# Patient Record
Sex: FEMALE | Race: WHITE | Employment: FULL TIME | URBAN - METROPOLITAN AREA
[De-identification: names, ages, dates, MRNs, and addresses within clinical notes are randomized per-mention and may not be internally consistent; named-entity substitution may affect disease eponyms.]

---

## 2017-10-25 PROBLEM — M81.8 OTHER OSTEOPOROSIS WITHOUT CURRENT PATHOLOGICAL FRACTURE: Status: ACTIVE | Noted: 2017-10-25

## 2020-03-01 ENCOUNTER — HOSPITAL ENCOUNTER (EMERGENCY)
Age: 65
Discharge: HOME | End: 2020-03-01
Payer: COMMERCIAL

## 2020-03-01 VITALS
BODY MASS INDEX: 28.63 KG/M2 | HEART RATE: 64 BPM | WEIGHT: 200 LBS | SYSTOLIC BLOOD PRESSURE: 134 MMHG | HEIGHT: 70 IN | OXYGEN SATURATION: 99 % | DIASTOLIC BLOOD PRESSURE: 74 MMHG | RESPIRATION RATE: 18 BRPM | TEMPERATURE: 98.2 F

## 2020-03-01 PROCEDURE — 99212 OFFICE O/P EST SF 10 MIN: CPT

## 2020-03-01 PROCEDURE — 96372 THER/PROPH/DIAG INJ SC/IM: CPT

## 2020-03-01 PROCEDURE — 6360000002 HC RX W HCPCS: Performed by: PHYSICIAN ASSISTANT

## 2020-03-01 RX ORDER — VIT C/B6/B5/MAGNESIUM/HERB 173 50-5-6-5MG
CAPSULE ORAL
COMMUNITY

## 2020-03-01 RX ORDER — PHENOL 1.4 %
1 AEROSOL, SPRAY (ML) MUCOUS MEMBRANE DAILY
COMMUNITY

## 2020-03-01 RX ORDER — FUROSEMIDE 20 MG/1
20 TABLET ORAL 2 TIMES DAILY
COMMUNITY

## 2020-03-01 RX ORDER — DICLOFENAC SODIUM 75 MG/1
75 TABLET, DELAYED RELEASE ORAL 2 TIMES DAILY
COMMUNITY

## 2020-03-01 RX ORDER — DEXAMETHASONE SODIUM PHOSPHATE 10 MG/ML
10 INJECTION, SOLUTION INTRAMUSCULAR; INTRAVENOUS ONCE
Status: COMPLETED | OUTPATIENT
Start: 2020-03-01 | End: 2020-03-01

## 2020-03-01 RX ORDER — ALENDRONATE SODIUM 70 MG/1
70 TABLET ORAL
COMMUNITY

## 2020-03-01 RX ORDER — KETOROLAC TROMETHAMINE 30 MG/ML
30 INJECTION, SOLUTION INTRAMUSCULAR; INTRAVENOUS ONCE
Status: COMPLETED | OUTPATIENT
Start: 2020-03-01 | End: 2020-03-01

## 2020-03-01 RX ORDER — KETOROLAC TROMETHAMINE 10 MG/1
10 TABLET, FILM COATED ORAL EVERY 6 HOURS PRN
Qty: 12 TABLET | Refills: 0 | Status: SHIPPED | OUTPATIENT
Start: 2020-03-01 | End: 2020-03-04

## 2020-03-01 RX ADMIN — DEXAMETHASONE SODIUM PHOSPHATE 10 MG: 10 INJECTION, SOLUTION INTRAMUSCULAR; INTRAVENOUS at 19:40

## 2020-03-01 RX ADMIN — KETOROLAC TROMETHAMINE 30 MG: 30 INJECTION, SOLUTION INTRAMUSCULAR; INTRAVENOUS at 19:40

## 2020-03-01 ASSESSMENT — PAIN DESCRIPTION - ONSET
ONSET: GRADUAL
ONSET: GRADUAL

## 2020-03-01 ASSESSMENT — PAIN DESCRIPTION - LOCATION
LOCATION: BACK;BUTTOCKS;LEG
LOCATION: BACK;BUTTOCKS;LEG

## 2020-03-01 ASSESSMENT — PAIN DESCRIPTION - DESCRIPTORS
DESCRIPTORS: STABBING
DESCRIPTORS: STABBING

## 2020-03-01 ASSESSMENT — PAIN DESCRIPTION - FREQUENCY
FREQUENCY: CONTINUOUS
FREQUENCY: CONTINUOUS

## 2020-03-01 ASSESSMENT — PAIN SCALES - GENERAL
PAINLEVEL_OUTOF10: 6
PAINLEVEL_OUTOF10: 8
PAINLEVEL_OUTOF10: 8

## 2020-03-01 ASSESSMENT — PAIN DESCRIPTION - ORIENTATION
ORIENTATION: LEFT;LOWER
ORIENTATION: LEFT;LOWER

## 2020-03-01 ASSESSMENT — PAIN DESCRIPTION - PAIN TYPE
TYPE: ACUTE PAIN
TYPE: ACUTE PAIN

## 2020-03-01 ASSESSMENT — PAIN DESCRIPTION - PROGRESSION
CLINICAL_PROGRESSION: GRADUALLY WORSENING
CLINICAL_PROGRESSION: GRADUALLY IMPROVING

## 2021-07-12 ENCOUNTER — OFFICE VISIT (OUTPATIENT)
Dept: ORTHOPEDIC SURGERY | Age: 66
End: 2021-07-12
Payer: COMMERCIAL

## 2021-07-12 VITALS — BODY MASS INDEX: 28.63 KG/M2 | WEIGHT: 200 LBS | HEIGHT: 70 IN | TEMPERATURE: 98 F

## 2021-07-12 DIAGNOSIS — S72.142G CLOSED DISPLACED INTERTROCHANTERIC FRACTURE OF LEFT FEMUR WITH DELAYED HEALING, SUBSEQUENT ENCOUNTER: Primary | ICD-10-CM

## 2021-07-12 DIAGNOSIS — S82.831A TRAUMATIC CLOSED NONDISPLACED FRACTURE OF DISTAL FIBULA, RIGHT, INITIAL ENCOUNTER: ICD-10-CM

## 2021-07-12 PROCEDURE — 99203 OFFICE O/P NEW LOW 30 MIN: CPT | Performed by: ORTHOPAEDIC SURGERY

## 2021-07-12 RX ORDER — HYDROCODONE BITARTRATE AND ACETAMINOPHEN 5; 325 MG/1; MG/1
1 TABLET ORAL EVERY 6 HOURS PRN
Qty: 28 TABLET | Refills: 0 | Status: SHIPPED | OUTPATIENT
Start: 2021-07-12 | End: 2021-07-19

## 2021-07-12 RX ORDER — ENOXAPARIN SODIUM 300 MG/3ML
INJECTION INTRAVENOUS; SUBCUTANEOUS DAILY
COMMUNITY

## 2021-07-12 RX ORDER — OXYCODONE HYDROCHLORIDE AND ACETAMINOPHEN 5; 325 MG/1; MG/1
1 TABLET ORAL EVERY 4 HOURS PRN
COMMUNITY
End: 2021-11-19

## 2021-07-12 NOTE — PROGRESS NOTES
Nicole Booker is a 60 y.o. female, who presents   Chief Complaint   Patient presents with    Hip Injury     Left hip fracture that required zoya and screw while on vaction in [place]. DOS 6/27/21. Patient fell and landed on hip.  Ankle Injury     Right ankle fracture. Hurt ankle in same fall as broken hip. Was placed in splint after injury and replaced in boot before returning home. HPI[de-identified] Mrs. Danika Hillman suffered an injury in a fall in [place] couple weeks ago. This was actually 6/27/2021. She ended up having surgery for for internal fixation of what is felt to be an intertrochanteric fracture. She also had a nondisplaced right distal fibula fracture which has been treated in the boot brace. She is doing fairly well though she is sore and stiff. He is using a walker and weightbearing on the right lower extremity with the boot brace on. Imaging was not sent with her. Allergies; medications; past medical, surgical, family, and social history; and problem list have been reviewed today and updated as indicated in this encounter - see below following Ortho specifics. Musculoskeletal: Gait with the walker is fairly good. There is some tenderness in the lateral right ankle as expected. There is mild edema. Ankle was not stressed because of the known injury. Left hip range of motion is fairly good with some guarding. She is sore as expected. Her alignment and length are good. Knee range of motion is good. She does have bilateral total knee replacements done by other surgeons in the past.    The thigh incisions are typical of those used for cephalomedullary lock nail fixation. The wounds look fine    Radiologic Studies: None    ASSESSMENT:  Kourtney Ceron was seen today for hip injury and ankle injury.     Diagnoses and all orders for this visit:    Closed displaced intertrochanteric fracture of left femur with delayed healing, subsequent encounter  -     HYDROcodone-acetaminophen (NORCO) 5-325 MG per tablet; Take 1 tablet by mouth every 6 hours as needed for Pain (pain) for up to 7 days. Traumatic closed nondisplaced fracture of distal fibula, right, initial encounter     Treatment alternatives were reviewed including medical and physical therapies, injections, and surgical options, expected risks benefits and likely outcome of each were discussed in detail, questions asked and answered and understood. We discussed the history as well as exam and expected issues she will experience postop which are not to worry her. PLAN: Continue with weightbearing right lower extremity with the brace. She can be partial weightbearing left lower extremity. She should continue to use her walker. She requested analgesia and was written prescription for short period of time. We will refer her for outpatient physical therapy. We will also schedule her for a return visit in a week and x-ray the right ankle and the left hip. Patient Active Problem List   Diagnosis    Other osteoporosis without current pathological fracture       Past Medical History:   Diagnosis Date    Diabetes mellitus ([facility] [address]     IDDM    Osteoporosis     fosamax    Restless leg syndrome     S/P total knee replacement     left knee       Past Surgical History:   Procedure Laterality Date     SECTION      CHOLECYSTECTOMY      JOINT REPLACEMENT      bilaterally    KNEE SURGERY  2011    torn meniscus       Current Outpatient Medications   Medication Sig Dispense Refill    enoxaparin (LOVENOX) 300 MG/3ML injection Inject into the skin daily      oxyCODONE-acetaminophen (PERCOCET) 5-325 MG per tablet Take 1 tablet by mouth every 4 hours as needed for Pain.  HYDROcodone-acetaminophen (NORCO) 5-325 MG per tablet Take 1 tablet by mouth every 6 hours as needed for Pain (pain) for up to 7 days.  28 tablet 0    Dulaglutide (TRULICITY SC) Inject into the skin      METFORMIN HCL PO Take by mouth      furosemide (LASIX) 20 MG tablet Take 20 mg by mouth 2 times daily      diclofenac (VOLTAREN) 75 MG EC tablet Take 75 mg by mouth 2 times daily      alendronate (FOSAMAX) 70 MG tablet Take 70 mg by mouth every 7 days      Turmeric 500 MG CAPS Take by mouth      calcium carbonate 600 MG TABS tablet Take 1 tablet by mouth daily      BIOTIN PO Take by mouth      ketorolac (TORADOL) 10 MG tablet Take 1 tablet by mouth every 6 hours as needed for Pain Patient had IM Toradol here in the ER. 12 tablet 0    empagliflozin (JARDIANCE) 10 MG tablet Take 10 mg by mouth daily      pregabalin (LYRICA) 75 MG capsule Take 75 mg by mouth 2 times daily      Multiple Vitamins-Minerals (THERAPEUTIC MULTIVITAMIN-MINERALS) tablet Take 1 tablet by mouth daily Indications: with vision      insulin glargine (LANTUS) 100 UNIT/ML injection Inject 20 Units into the skin nightly. No current facility-administered medications for this visit. No Known Allergies    Social History     Socioeconomic History    Marital status:      Spouse name: None    Number of children: None    Years of education: None    Highest education level: None   Occupational History    None   Tobacco Use    Smoking status: Never Smoker    Smokeless tobacco: Never Used   Substance and Sexual Activity    Alcohol use: No    Drug use: No    Sexual activity: Yes   Other Topics Concern    None   Social History Narrative    None     Social Determinants of Health     Financial Resource Strain:     Difficulty of Paying Living Expenses:    Food Insecurity:     Worried About Running Out of Food in the Last Year:     Ran Out of Food in the Last Year:    Transportation Needs:     Lack of Transportation (Medical):      Lack of Transportation (Non-Medical):    Physical Activity:     Days of Exercise per Week:     Minutes of Exercise per Session:    Stress:     Feeling of Stress :    Social Connections:     Frequency of Communication with Friends and Family:     Frequency of Social Gatherings with Friends and Family:     Attends Protestant Services:     Active Member of Clubs or Organizations:     Attends Club or Organization Meetings:     Marital Status:    Intimate Partner Violence:     Fear of Current or Ex-Partner:     Emotionally Abused:     Physically Abused:     Sexually Abused:        History reviewed. No pertinent family history. Review of Systems:   As follows except as previously noted in HPI:  Constitutional: Negative for chills, diaphoresis,  fever   Respiratory: Negative for cough, shortness of breath and wheezing. Cardiovascular: Negative for chest pain and palpitations. Neurological: Negative for dizziness, syncope,   GI / : abdominal pain or cramping  Musculoskeletal: see HPI       Objective:   Physical Exam   Constitutional: Oriented to person, place, and time. and appears well-developed and well-nourished. :   Head: Normocephalic and atraumatic. Neck: Neck supple. Eyes: EOM are normal.   Pulmonary/Chest: Effort normal.  No respiratory distress, no wheezes. Neurological: Alert and oriented to person  Skin: Skin is warm and dry. Vivi Collins DO    7/12/21  12:18 PM    All reasonable efforts have been made to minimize the risk of errors that may occur in the use of voice recognition and other electronic means of charting.

## 2021-07-16 DIAGNOSIS — S72.142G CLOSED DISPLACED INTERTROCHANTERIC FRACTURE OF LEFT FEMUR WITH DELAYED HEALING, SUBSEQUENT ENCOUNTER: Primary | ICD-10-CM

## 2021-07-16 DIAGNOSIS — S82.831A TRAUMATIC CLOSED NONDISPLACED FRACTURE OF DISTAL FIBULA, RIGHT, INITIAL ENCOUNTER: ICD-10-CM

## 2021-07-19 ENCOUNTER — OFFICE VISIT (OUTPATIENT)
Dept: ORTHOPEDIC SURGERY | Age: 66
End: 2021-07-19
Payer: COMMERCIAL

## 2021-07-19 VITALS — BODY MASS INDEX: 28.63 KG/M2 | WEIGHT: 200 LBS | HEIGHT: 70 IN

## 2021-07-19 DIAGNOSIS — S72.142G CLOSED DISPLACED INTERTROCHANTERIC FRACTURE OF LEFT FEMUR WITH DELAYED HEALING, SUBSEQUENT ENCOUNTER: Primary | ICD-10-CM

## 2021-07-19 DIAGNOSIS — S82.831A TRAUMATIC CLOSED NONDISPLACED FRACTURE OF DISTAL FIBULA, RIGHT, INITIAL ENCOUNTER: ICD-10-CM

## 2021-07-19 PROCEDURE — 99213 OFFICE O/P EST LOW 20 MIN: CPT | Performed by: ORTHOPAEDIC SURGERY

## 2021-07-19 NOTE — PROGRESS NOTES
Chief Complaint:   Chief Complaint   Patient presents with    Hip Pain     Left hip fracture follow up. DOS 6/27/2021    Ankle Pain     Right ankle fracture follow up. Renata Lewis's 3 weeks post injury to her right distal fibula and her left intertrochanteric femur. She is essentially 3 weeks postop fixation of the intertrochanteric fracture. She is doing fairly well. She is not having a lot of discomfort. Allergies; medications; past medical, surgical, family, and social history; and problem list have been reviewed today and updated as indicated in this encounter seen below. Exam: The ankles in the boot. There is minimal tenderness. Knee range of motion is good without pain. Left hip range of motion is limited with some tenderness as expected. No complications of surgery noted. Radiographs: Imaging of the right ankle and 3 views shows good alignment of the Steele B distal fibula fracture. The ankle mortise and syndesmosis are well-maintained. The left hip in 2 views shows a short locked cephalomedullary nail. This is in good overall alignment. The lag screw seems to be well located in the femoral head near the center center area. There is noticeable shortening along the line of the lag screw and comminution in the areas noted with 3 fragments of the lesser trochanter prominent. ASSESSMENT:    Ines Quevedo was seen today for hip pain and ankle pain. Diagnoses and all orders for this visit:    Closed displaced intertrochanteric fracture of left femur with delayed healing, subsequent encounter    Traumatic closed nondisplaced fracture of distal fibula, right, initial encounter        PLAN: Imaging results were discussed with the patient and her . She should continue with partial weightbearing on the left lower extremity. She may continue weightbearing on the right with the boot brace in place. We will reji-ray in 4 weeks equal and hip to assess healing progress.     Return in about 4 weeks (around 2021) for X-ray left hip and right ankle. Current Outpatient Medications   Medication Sig Dispense Refill    enoxaparin (LOVENOX) 300 MG/3ML injection Inject into the skin daily      oxyCODONE-acetaminophen (PERCOCET) 5-325 MG per tablet Take 1 tablet by mouth every 4 hours as needed for Pain.  HYDROcodone-acetaminophen (NORCO) 5-325 MG per tablet Take 1 tablet by mouth every 6 hours as needed for Pain (pain) for up to 7 days. 28 tablet 0    Dulaglutide (TRULICITY SC) Inject into the skin      METFORMIN HCL PO Take by mouth      furosemide (LASIX) 20 MG tablet Take 20 mg by mouth 2 times daily      diclofenac (VOLTAREN) 75 MG EC tablet Take 75 mg by mouth 2 times daily      alendronate (FOSAMAX) 70 MG tablet Take 70 mg by mouth every 7 days      Turmeric 500 MG CAPS Take by mouth      calcium carbonate 600 MG TABS tablet Take 1 tablet by mouth daily      BIOTIN PO Take by mouth      ketorolac (TORADOL) 10 MG tablet Take 1 tablet by mouth every 6 hours as needed for Pain Patient had IM Toradol here in the ER. 12 tablet 0    empagliflozin (JARDIANCE) 10 MG tablet Take 10 mg by mouth daily      pregabalin (LYRICA) 75 MG capsule Take 75 mg by mouth 2 times daily      Multiple Vitamins-Minerals (THERAPEUTIC MULTIVITAMIN-MINERALS) tablet Take 1 tablet by mouth daily Indications: with vision      insulin glargine (LANTUS) 100 UNIT/ML injection Inject 20 Units into the skin nightly. No current facility-administered medications for this visit.        Patient Active Problem List   Diagnosis    Other osteoporosis without current pathological fracture       Past Medical History:   Diagnosis Date    Diabetes mellitus ([facility] [address]     IDDM    Osteoporosis     fosamax    Restless leg syndrome     S/P total knee replacement     left knee       Past Surgical History:   Procedure Laterality Date     SECTION      CHOLECYSTECTOMY      JOINT REPLACEMENT bilaterally    KNEE SURGERY  1/2011    torn meniscus       No Known Allergies    Social History     Socioeconomic History    Marital status:      Spouse name: None    Number of children: None    Years of education: None    Highest education level: None   Occupational History    None   Tobacco Use    Smoking status: Never Smoker    Smokeless tobacco: Never Used   Substance and Sexual Activity    Alcohol use: No    Drug use: No    Sexual activity: Yes   Other Topics Concern    None   Social History Narrative    None     Social Determinants of Health     Financial Resource Strain:     Difficulty of Paying Living Expenses:    Food Insecurity:     Worried About Running Out of Food in the Last Year:     Ran Out of Food in the Last Year:    Transportation Needs:     Lack of Transportation (Medical):  Lack of Transportation (Non-Medical):    Physical Activity:     Days of Exercise per Week:     Minutes of Exercise per Session:    Stress:     Feeling of Stress :    Social Connections:     Frequency of Communication with Friends and Family:     Frequency of Social Gatherings with Friends and Family:     Attends Synagogue Services:     Active Member of Clubs or Organizations:     Attends Club or Organization Meetings:     Marital Status:    Intimate Partner Violence:     Fear of Current or Ex-Partner:     Emotionally Abused:     Physically Abused:     Sexually Abused:        Review of Systems  As follows except as previously noted in HPI:  Constitutional: Negative for chills, diaphoresis, fatigue, fever and unexpected weight change. Respiratory: Negative for cough, shortness of breath and wheezing. Cardiovascular: Negative for chest pain and palpitations. Neurological: Negative for dizziness, syncope, cephalgia. GI / : negative  Musculoskeletal: see HPI       Objective:   Physical Exam   Constitutional: Oriented to person, place, and time.  and appears well-developed and well-nourished. :   Head: Normocephalic and atraumatic. Eyes: EOM are normal.   Neck: Neck supple. Cardiovascular: Normal rate and regular rhythm. Pulmonary/Chest: Effort normal. No stridor. No respiratory distress, no wheezes. Abdominal:  No abnormal distension. Neurological: Alert and oriented to person, place, and time. Skin: Skin is warm and dry. Psychiatric: Normal mood and affect.  Behavior is normal. Thought content normal.    GORDON Narayan DO    7/19/21  10:42 AM

## 2021-08-16 ENCOUNTER — OFFICE VISIT (OUTPATIENT)
Dept: ORTHOPEDIC SURGERY | Age: 66
End: 2021-08-16

## 2021-08-16 VITALS — BODY MASS INDEX: 28.63 KG/M2 | HEIGHT: 70 IN | WEIGHT: 200 LBS | TEMPERATURE: 98 F

## 2021-08-16 DIAGNOSIS — S72.142G CLOSED DISPLACED INTERTROCHANTERIC FRACTURE OF LEFT FEMUR WITH DELAYED HEALING, SUBSEQUENT ENCOUNTER: ICD-10-CM

## 2021-08-16 DIAGNOSIS — S82.831A TRAUMATIC CLOSED NONDISPLACED FRACTURE OF DISTAL FIBULA, RIGHT, INITIAL ENCOUNTER: ICD-10-CM

## 2021-08-16 DIAGNOSIS — M25.552 LEFT HIP PAIN: ICD-10-CM

## 2021-08-16 DIAGNOSIS — M79.671 RIGHT FOOT PAIN: Primary | ICD-10-CM

## 2021-08-16 DIAGNOSIS — M25.562 ACUTE PAIN OF LEFT KNEE: Primary | ICD-10-CM

## 2021-08-16 PROCEDURE — 99024 POSTOP FOLLOW-UP VISIT: CPT | Performed by: ORTHOPAEDIC SURGERY

## 2021-08-16 RX ORDER — CHOLECALCIFEROL (VITAMIN D3) 1250 MCG
CAPSULE ORAL
COMMUNITY
Start: 2021-08-09

## 2021-08-16 RX ORDER — PANTOPRAZOLE SODIUM 40 MG/1
TABLET, DELAYED RELEASE ORAL
COMMUNITY
Start: 2021-07-18

## 2021-08-16 RX ORDER — VALACYCLOVIR HYDROCHLORIDE 1 G/1
TABLET, FILM COATED ORAL
COMMUNITY

## 2021-08-16 RX ORDER — GLIMEPIRIDE 4 MG/1
4 TABLET ORAL
COMMUNITY

## 2021-08-16 RX ORDER — ACETAMINOPHEN AND CODEINE PHOSPHATE 300; 30 MG/1; MG/1
TABLET ORAL
COMMUNITY
End: 2021-11-19

## 2021-09-13 ENCOUNTER — OFFICE VISIT (OUTPATIENT)
Dept: ORTHOPEDIC SURGERY | Age: 66
End: 2021-09-13
Payer: COMMERCIAL

## 2021-09-13 VITALS — WEIGHT: 200 LBS | BODY MASS INDEX: 28.63 KG/M2 | TEMPERATURE: 98 F | HEIGHT: 70 IN

## 2021-09-13 DIAGNOSIS — S82.831A TRAUMATIC CLOSED NONDISPLACED FRACTURE OF DISTAL FIBULA, RIGHT, INITIAL ENCOUNTER: ICD-10-CM

## 2021-09-13 DIAGNOSIS — S72.142G CLOSED DISPLACED INTERTROCHANTERIC FRACTURE OF LEFT FEMUR WITH DELAYED HEALING, SUBSEQUENT ENCOUNTER: Primary | ICD-10-CM

## 2021-09-13 PROCEDURE — 99213 OFFICE O/P EST LOW 20 MIN: CPT | Performed by: ORTHOPAEDIC SURGERY

## 2021-09-13 RX ORDER — EMPAGLIFLOZIN 25 MG/1
TABLET, FILM COATED ORAL
COMMUNITY
Start: 2021-09-11

## 2021-09-13 RX ORDER — TRAMADOL HYDROCHLORIDE 50 MG/1
50 TABLET ORAL EVERY 6 HOURS PRN
Qty: 28 TABLET | Refills: 0 | Status: SHIPPED | OUTPATIENT
Start: 2021-09-13 | End: 2021-09-16

## 2021-09-13 NOTE — LETTER
[address] [place] [address]  Phone: [phone]  Fax: [phone]    Jhoana Quintanilla DO         September 13, 2021     Patient: Lynn Tirado   YOB: 1955   Date of Visit: 9/13/2021       To Whom It May Concern: It is my medical opinion that Ashlie Bills requires a disability parking placard for the following reasons:  She has limited walking ability due to an orthopedic condition. Duration of need: 1 year    If you have any questions or concerns, please don't hesitate to call.     Sincerely,        Jhoana Quintanilla DO

## 2021-09-13 NOTE — PROGRESS NOTES
Chief Complaint:   Chief Complaint   Patient presents with    Ankle Pain     right ankle f/u doing great    Hip Pain     left hip f/u  having some pain, the left knee is improving       Justyn Hernandez has 16 weeks post op intertrochanteric enteric left hip fracture fixation. He is doing pretty well. Her range of motion is fairly good but she still has a limp and uses a trekking type cane. Her right ankle is doing very well. She has plans to return to work very soon. Her issues are the long hours and also the mix of activities. She is a manager at [employer]. She is requesting a final prescription for analgesics. She is also asking for handicap parking extension considering her workplace and parking issues. Allergies; medications; past medical, surgical, family, and social history; and problem list have been reviewed today and updated as indicated in this encounter seen below. Exam: The left hip moves fairly well. There is little discomfort. The left knee which has been replaced is stable and moves well. The right knee which has been replaced moves well and is stable. Right ankle motion is fairly good with some residual edema but little discomfort. Radiographs: Previous imaging of the hip shows progressive healing with some shortening along the axis of the femoral neck. Fixation is stable. ASSESSMENT:    Cyndy Dyer was seen today for ankle pain and hip pain. Diagnoses and all orders for this visit:    Closed displaced intertrochanteric fracture of left femur with delayed healing, subsequent encounter  -     traMADol (ULTRAM) 50 MG tablet; Take 1 tablet by mouth every 6 hours as needed for Pain (pain) for up to 3 days. Traumatic closed nondisplaced fracture of distal fibula, right, initial encounter  -     traMADol (ULTRAM) 50 MG tablet; Take 1 tablet by mouth every 6 hours as needed for Pain (pain) for up to 3 days.     We discussed her limp which should gradually improve but in part is related to her loss of length from the fracture collapsing along the designed axis of the lag screw. She could regain some length by no more than quarter inch shoe insert and beyond that would require a built-up sole in her shoe. This is something for her to consider over time. PLAN: We will release her back to work with restrictions including 70% sedentary activity, 30% walking. Will extend her handicap parking privileges for about 6 months. Her workday should be limited to 8 hours. We will follow-up in 4 weeks. Return in about 4 weeks (around 10/11/2021). Current Outpatient Medications   Medication Sig Dispense Refill    JARDIANCE 25 MG tablet       traMADol (ULTRAM) 50 MG tablet Take 1 tablet by mouth every 6 hours as needed for Pain (pain) for up to 3 days. 28 tablet 0    acetaminophen-codeine (TYLENOL #3) 300-30 MG per tablet acetaminophen 300 mg-codeine 30 mg tablet      calcium carbonate-vitamin D (CALTRATE) 600-400 MG-UNIT TABS per tab Take 1 tablet by mouth daily      Cholecalciferol (VITAMIN D3) 1.25 MG (72424 UT) CAPS TAKE 1 CAPSULE BY MOUTH EVERY WEEK      pantoprazole (PROTONIX) 40 MG tablet TAKE ONE TABLET BY MOUTH EVERY DAY      valACYclovir (VALTREX) 1 g tablet valacyclovir 1 gram tablet   TAKE ONE TABLET BY MOUTH THREE TIMES A DAY AS DIRECTED      glimepiride (AMARYL) 4 MG tablet Take 4 mg by mouth every morning (before breakfast) bid      enoxaparin (LOVENOX) 300 MG/3ML injection Inject into the skin daily      oxyCODONE-acetaminophen (PERCOCET) 5-325 MG per tablet Take 1 tablet by mouth every 4 hours as needed for Pain.       Dulaglutide (TRULICITY SC) Inject into the skin      furosemide (LASIX) 20 MG tablet Take 20 mg by mouth 2 times daily      diclofenac (VOLTAREN) 75 MG EC tablet Take 75 mg by mouth 2 times daily      alendronate (FOSAMAX) 70 MG tablet Take 70 mg by mouth every 7 days      Turmeric 500 MG CAPS Take by mouth      calcium carbonate 600 MG TABS tablet Take 1 tablet by mouth daily      BIOTIN PO Take by mouth      pregabalin (LYRICA) 75 MG capsule Take 75 mg by mouth 2 times daily      Multiple Vitamins-Minerals (THERAPEUTIC MULTIVITAMIN-MINERALS) tablet Take 1 tablet by mouth daily Indications: with vision      ketorolac (TORADOL) 10 MG tablet Take 1 tablet by mouth every 6 hours as needed for Pain Patient had IM Toradol here in the ER. 12 tablet 0     No current facility-administered medications for this visit. Patient Active Problem List   Diagnosis    Other osteoporosis without current pathological fracture       Past Medical History:   Diagnosis Date    Diabetes mellitus ([address] [address]     IDDM    Osteoporosis     fosamax    Restless leg syndrome     S/P total knee replacement     left knee       Past Surgical History:   Procedure Laterality Date     SECTION      CHOLECYSTECTOMY      JOINT REPLACEMENT      bilaterally    KNEE SURGERY  2011    torn meniscus       No Known Allergies    Social History     Socioeconomic History    Marital status:      Spouse name: None    Number of children: None    Years of education: None    Highest education level: None   Occupational History    None   Tobacco Use    Smoking status: Never Smoker    Smokeless tobacco: Never Used   Substance and Sexual Activity    Alcohol use: No    Drug use: No    Sexual activity: Yes   Other Topics Concern    None   Social History Narrative    None     Social Determinants of Health     Financial Resource Strain:     Difficulty of Paying Living Expenses:    Food Insecurity:     Worried About Running Out of Food in the Last Year:     Ran Out of Food in the Last Year:    Transportation Needs:     Lack of Transportation (Medical):      Lack of Transportation (Non-Medical):    Physical Activity:     Days of Exercise per Week:     Minutes of Exercise per Session:    Stress:     Feeling of Stress :    Social Connections:     Frequency of Communication with Friends and Family:     Frequency of Social Gatherings with Friends and Family:     Attends Mor[employer] Services:     Active Member of Clubs or Organizations:     Attends Club or Organization Meetings:     Marital Status:    Intimate Partner Violence:     Fear of Current or Ex-Partner:     Emotionally Abused:     Physically Abused:     Sexually Abused:        Review of Systems  As follows except as previously noted in HPI:  Constitutional: Negative for chills, diaphoresis, fatigue, fever and unexpected weight change. Respiratory: Negative for cough, shortness of breath and wheezing. Cardiovascular: Negative for chest pain and palpitations. Neurological: Negative for dizziness, syncope, cephalgia. GI / : negative  Musculoskeletal: see HPI       Objective:   Physical Exam   Constitutional: Oriented to person, place, and time. and appears well-developed and well-nourished. :   Head: Normocephalic and atraumatic. Eyes: EOM are normal.   Neck: Neck supple. Cardiovascular: Normal rate and regular rhythm. Pulmonary/Chest: Effort normal. No stridor. No respiratory distress, no wheezes. Abdominal:  No abnormal distension. Neurological: Alert and oriented to person, place, and time. Skin: Skin is warm and dry. Psychiatric: Normal mood and affect.  Behavior is normal. Thought content normal.    GORDON Fernandes DO    9/13/21  11:52 AM

## 2021-10-15 ENCOUNTER — OFFICE VISIT (OUTPATIENT)
Dept: ORTHOPEDIC SURGERY | Age: 66
End: 2021-10-15
Payer: COMMERCIAL

## 2021-10-15 VITALS — HEIGHT: 70 IN | WEIGHT: 200 LBS | TEMPERATURE: 98 F | BODY MASS INDEX: 28.63 KG/M2

## 2021-10-15 DIAGNOSIS — S72.142G CLOSED DISPLACED INTERTROCHANTERIC FRACTURE OF LEFT FEMUR WITH DELAYED HEALING, SUBSEQUENT ENCOUNTER: Primary | ICD-10-CM

## 2021-10-15 DIAGNOSIS — M25.552 LEFT HIP PAIN: ICD-10-CM

## 2021-10-15 PROCEDURE — 99213 OFFICE O/P EST LOW 20 MIN: CPT | Performed by: ORTHOPAEDIC SURGERY

## 2021-10-15 NOTE — PROGRESS NOTES
Chief Complaint:   Chief Complaint   Patient presents with    Hip Pain     Left Hip/Quad muscle, F/U, still in PT       Ho Galo is a less 4 months postop left hip fracture and fixation. This was done in [place] where she injured herself. She is doing better. She is noticing increased strength and little better activities. She still has some pain in the anterior lateral left thigh and she indicates the quadriceps muscles. She says it has gotten a little bit better. She also has a little bit of a lurch in her gait and she is using a trekking stick when she walks. Allergies; medications; past medical, surgical, family, and social history; and problem list have been reviewed today and updated as indicated in this encounter seen below. Exam: There is tenderness in the lateral hip area. There is some decreased range of motion mostly in rotation of the hip. Her gait does show a limp and a drooping of the pelvis to the left confirming a leg length discrepancy on the left which is obvious when she sits and with her legs extended. Radiographs: Previous imaging was reviewed and showed the short cephalomedullary nail fixation with some impaction and resulting shortening. It appears that alignment is good in the station is stable. ASSESSMENT:    Osmany Tirado was seen today for hip pain. Diagnoses and all orders for this visit:    Closed displaced intertrochanteric fracture of left femur with delayed healing, subsequent encounter    Left hip pain        PLAN: Continue with her exercises and physical therapy. We will continue restrictions at work. We will follow-up in a month and see how are comfort level is progressing. I also suggested that she get a shoe built-up shoe sole rather than use excessive lift in the heel which will just push her foot out the shoe. Return in about 4 weeks (around 11/12/2021) for L hip X.        Current Outpatient Medications   Medication Sig Dispense Refill    JARDIANCE 25 MG tablet       acetaminophen-codeine (TYLENOL #3) 300-30 MG per tablet acetaminophen 300 mg-codeine 30 mg tablet      calcium carbonate-vitamin D (CALTRATE) 600-400 MG-UNIT TABS per tab Take 1 tablet by mouth daily      Cholecalciferol (VITAMIN D3) 1.25 MG (52193 UT) CAPS TAKE 1 CAPSULE BY MOUTH EVERY WEEK      pantoprazole (PROTONIX) 40 MG tablet TAKE ONE TABLET BY MOUTH EVERY DAY      valACYclovir (VALTREX) 1 g tablet valacyclovir 1 gram tablet   TAKE ONE TABLET BY MOUTH THREE TIMES A DAY AS DIRECTED      glimepiride (AMARYL) 4 MG tablet Take 4 mg by mouth every morning (before breakfast) bid      enoxaparin (LOVENOX) 300 MG/3ML injection Inject into the skin daily      oxyCODONE-acetaminophen (PERCOCET) 5-325 MG per tablet Take 1 tablet by mouth every 4 hours as needed for Pain.  Dulaglutide (TRULICITY SC) Inject into the skin      furosemide (LASIX) 20 MG tablet Take 20 mg by mouth 2 times daily      diclofenac (VOLTAREN) 75 MG EC tablet Take 75 mg by mouth 2 times daily      alendronate (FOSAMAX) 70 MG tablet Take 70 mg by mouth every 7 days      Turmeric 500 MG CAPS Take by mouth      calcium carbonate 600 MG TABS tablet Take 1 tablet by mouth daily      BIOTIN PO Take by mouth      pregabalin (LYRICA) 75 MG capsule Take 75 mg by mouth 2 times daily      Multiple Vitamins-Minerals (THERAPEUTIC MULTIVITAMIN-MINERALS) tablet Take 1 tablet by mouth daily Indications: with vision      ketorolac (TORADOL) 10 MG tablet Take 1 tablet by mouth every 6 hours as needed for Pain Patient had IM Toradol here in the ER. 12 tablet 0     No current facility-administered medications for this visit.        Patient Active Problem List   Diagnosis    Other osteoporosis without current pathological fracture       Past Medical History:   Diagnosis Date    Diabetes mellitus ([facility] [address]     IDDM    Osteoporosis     fosamax    Restless leg syndrome     S/P total knee replacement     left knee Past Surgical History:   Procedure Laterality Date     SECTION      CHOLECYSTECTOMY      JOINT REPLACEMENT      bilaterally    KNEE SURGERY  2011    torn meniscus       No Known Allergies    Social History     Socioeconomic History    Marital status:      Spouse name: None    Number of children: None    Years of education: None    Highest education level: None   Occupational History    None   Tobacco Use    Smoking status: Never Smoker    Smokeless tobacco: Never Used   Substance and Sexual Activity    Alcohol use: No    Drug use: No    Sexual activity: Yes   Other Topics Concern    None   Social History Narrative    None     Social Determinants of Health     Financial Resource Strain:     Difficulty of Paying Living Expenses:    Food Insecurity:     Worried About Running Out of Food in the Last Year:     Ran Out of Food in the Last Year:    Transportation Needs:     Lack of Transportation (Medical):  Lack of Transportation (Non-Medical):    Physical Activity:     Days of Exercise per Week:     Minutes of Exercise per Session:    Stress:     Feeling of Stress :    Social Connections:     Frequency of Communication with Friends and Family:     Frequency of Social Gatherings with Friends and Family:     Attends Restoration Services:     Active Member of Clubs or Organizations:     Attends Club or Organization Meetings:     Marital Status:    Intimate Partner Violence:     Fear of Current or Ex-Partner:     Emotionally Abused:     Physically Abused:     Sexually Abused:        Review of Systems  As follows except as previously noted in HPI:  Constitutional: Negative for chills, diaphoresis, fatigue, fever and unexpected weight change. Respiratory: Negative for cough, shortness of breath and wheezing. Cardiovascular: Negative for chest pain and palpitations. Neurological: Negative for dizziness, syncope, cephalgia.   GI / : negative  Musculoskeletal: see HPI       Objective:   Physical Exam   Constitutional: Oriented to person, place, and time. and appears well-developed and well-nourished. :   Head: Normocephalic and atraumatic. Eyes: EOM are normal.   Neck: Neck supple. Cardiovascular: Normal rate and regular rhythm. Pulmonary/Chest: Effort normal. No stridor. No respiratory distress, no wheezes. Abdominal:  No abnormal distension. Neurological: Alert and oriented to person, place, and time. Skin: Skin is warm and dry. Psychiatric: Normal mood and affect.  Behavior is normal. Thought content normal.    K Teressa Heimlich, DO    10/15/21  8:36 AM

## 2021-11-17 DIAGNOSIS — M81.0 SENILE OSTEOPOROSIS: ICD-10-CM

## 2021-11-18 DIAGNOSIS — S72.142G CLOSED DISPLACED INTERTROCHANTERIC FRACTURE OF LEFT FEMUR WITH DELAYED HEALING, SUBSEQUENT ENCOUNTER: Primary | ICD-10-CM

## 2021-11-19 ENCOUNTER — OFFICE VISIT (OUTPATIENT)
Dept: ORTHOPEDIC SURGERY | Age: 66
End: 2021-11-19
Payer: COMMERCIAL

## 2021-11-19 VITALS — BODY MASS INDEX: 28.63 KG/M2 | TEMPERATURE: 98 F | WEIGHT: 200 LBS | HEIGHT: 70 IN

## 2021-11-19 DIAGNOSIS — S72.142G CLOSED DISPLACED INTERTROCHANTERIC FRACTURE OF LEFT FEMUR WITH DELAYED HEALING, SUBSEQUENT ENCOUNTER: Primary | ICD-10-CM

## 2021-11-19 PROCEDURE — 99213 OFFICE O/P EST LOW 20 MIN: CPT | Performed by: ORTHOPAEDIC SURGERY

## 2021-11-19 RX ORDER — ACETAMINOPHEN AND CODEINE PHOSPHATE 300; 30 MG/1; MG/1
1 TABLET ORAL EVERY 4 HOURS PRN
Qty: 30 TABLET | Refills: 0 | Status: SHIPPED | OUTPATIENT
Start: 2021-11-19 | End: 2021-11-26

## 2021-11-19 NOTE — PROGRESS NOTES
Chief Complaint:   Chief Complaint   Patient presents with    Fracture     Left hip follow up. Patient states she mostly has pain when she walks too much or uses muscle more then usual.        Delonte Crocker there is little less than 5 months postop from a medullary nail fixation for a left intertrochanteric fracture. This is done in [place]. She continues to have some discomfort in her hip particularly late in the day after she is worked all day. She has increased her activities and is tolerating it better. She does have a limp and notices that her left lower extremity is shorter. She is using a shoe insert for this but it was asking about additional length compensation. Elizabeth requests 1 more prescription of pain medication for use mainly at night on an occasional basis. Allergies; medications; past medical, surgical, family, and social history; and problem list have been reviewed today and updated as indicated in this encounter seen below. Exam: Single-leg stance is little more difficult on the left than the right. She needs to compensate for hip abductor weakness. Gait favors left lower extremity in part because of the leg length discrepancy. Range of motion of the left hip is decreased in all planes to some extent. She has a functional range of motion. There is mild discomfort at the limits. Radiographs: Imaging of the left hip and 3 views shows maintenance of position alignment and fixation of the intertrochanteric fracture fragments. There is some residual fracture line along the inferior neck though it appears that there is more lateral and proximal healing. There is no separation or new bone adjacent to the greater trochanter which has  slightly. There is no appearance of loosening of any of the hardware. ASSESSMENT:    [facility].     Diagnoses and all orders for this visit:    Closed displaced intertrochanteric fracture of left femur with delayed healing, subsequent encounter  -     acetaminophen-codeine (TYLENOL #3) 300-30 MG per tablet; Take 1 tablet by mouth every 4 hours as needed for Pain for up to 7 days. PLAN: Prescription was written and this will be the final episode of that. We will refer her to [facility] prost[facility] for compensation for leg length. We will follow-up in 3 months and x-ray of the left hip. She requested all restrictions be eliminated for work and this was done. Return in about 3 months (around 2/19/2022) for L hip X. Current Outpatient Medications   Medication Sig Dispense Refill    acetaminophen-codeine (TYLENOL #3) 300-30 MG per tablet Take 1 tablet by mouth every 4 hours as needed for Pain for up to 7 days. 30 tablet 0    JARDIANCE 25 MG tablet       calcium carbonate-vitamin D (CALTRATE) 600-400 MG-UNIT TABS per tab Take 1 tablet by mouth daily      Cholecalciferol (VITAMIN D3) 1.25 MG (56546 UT) CAPS TAKE 1 CAPSULE BY MOUTH EVERY WEEK      pantoprazole (PROTONIX) 40 MG tablet TAKE ONE TABLET BY MOUTH EVERY DAY      valACYclovir (VALTREX) 1 g tablet valacyclovir 1 gram tablet   TAKE ONE TABLET BY MOUTH THREE TIMES A DAY AS DIRECTED      glimepiride (AMARYL) 4 MG tablet Take 4 mg by mouth every morning (before breakfast) bid      enoxaparin (LOVENOX) 300 MG/3ML injection Inject into the skin daily      Dulaglutide (TRULICITY SC) Inject into the skin      furosemide (LASIX) 20 MG tablet Take 20 mg by mouth 2 times daily      diclofenac (VOLTAREN) 75 MG EC tablet Take 75 mg by mouth 2 times daily      alendronate (FOSAMAX) 70 MG tablet Take 70 mg by mouth every 7 days      Turmeric 500 MG CAPS Take by mouth      calcium carbonate 600 MG TABS tablet Take 1 tablet by mouth daily      BIOTIN PO Take by mouth      ketorolac (TORADOL) 10 MG tablet Take 1 tablet by mouth every 6 hours as needed for Pain Patient had IM Toradol here in the ER.  12 tablet 0    pregabalin (LYRICA) 75 MG

## 2022-01-24 ENCOUNTER — HOSPITAL ENCOUNTER (OUTPATIENT)
Dept: GENERAL RADIOLOGY | Age: 67
Discharge: HOME | End: 2022-01-26
Payer: COMMERCIAL

## 2022-01-24 DIAGNOSIS — R92.8 ABNORMAL MAMMOGRAM: ICD-10-CM

## 2022-01-24 PROCEDURE — 77065 DX MAMMO INCL CAD UNI: CPT

## 2022-02-21 DIAGNOSIS — M25.552 LEFT HIP PAIN: Primary | ICD-10-CM

## 2022-02-23 ENCOUNTER — OFFICE VISIT (OUTPATIENT)
Dept: ORTHOPEDIC SURGERY | Age: 67
End: 2022-02-23
Payer: COMMERCIAL

## 2022-02-23 VITALS — HEIGHT: 70 IN | BODY MASS INDEX: 26.77 KG/M2 | TEMPERATURE: 98 F | WEIGHT: 187 LBS

## 2022-02-23 DIAGNOSIS — S72.142G CLOSED DISPLACED INTERTROCHANTERIC FRACTURE OF LEFT FEMUR WITH DELAYED HEALING, SUBSEQUENT ENCOUNTER: Primary | ICD-10-CM

## 2022-02-23 PROCEDURE — 99213 OFFICE O/P EST LOW 20 MIN: CPT | Performed by: ORTHOPAEDIC SURGERY

## 2022-02-23 RX ORDER — DULAGLUTIDE 4.5 MG/.5ML
INJECTION, SOLUTION SUBCUTANEOUS
COMMUNITY
Start: 2022-01-11

## 2022-02-23 NOTE — PROGRESS NOTES
Chief Complaint:   Chief Complaint   Patient presents with    Hip Pain     left hip pain says she can feel movement. never  noticed it before       Ottoniel Hall follows up 8 months post op fixation left intertrochanteric fracture. This was done in the hospital in [place]. She still has some discomfort in the lateral left hip area. There is a little bit of crepitus. She is also ready for some new shoes. She had buildups done by the orthotist because of the acquired leg length deficiency related to the fracture. Allergies; medications; past medical, surgical, family, and social history; and problem list have been reviewed today and updated as indicated in this encounter seen below. Exam: Some tenderness in the lateral left hip area. There is crepitus with range of motion. This would be from the lateral end of the lag screw from the cephalomedullary nail construct. There is some overall loss of motion in the hip but this has not been progressive in nature. Left lower extremity is noticeably shorter. Radiographs: Imaging of the left hip and 2 view shows progressive healing of the intertrochanteric fracture. There is shortening through the neck area because of impaction of the fracture. The lateral end of the lag screw is prominent beyond the lateral cortex as a result of the impaction. There is the ununited lesser trochanteric fragment. There is also some heterotopic bone adjacent to the proximal end of the nail. ASSESSMENT:    Francis Truong was seen today for hip pain. Diagnoses and all orders for this visit:    Closed displaced intertrochanteric fracture of left femur with delayed healing, subsequent encounter        PLAN: We discussed the issues. The only way to change the lateral hip discomfort would be to remove hardware which she is not interested in. She would like to have another pair shoes and needs a prescription which we will supply for gladly.   Follow-up will be on an as-needed basis. Return if symptoms worsen or fail to improve. Current Outpatient Medications   Medication Sig Dispense Refill    TRULICITY 4.5 EZ/9.5PF SOPN       JARDIANCE 25 MG tablet       calcium carbonate-vitamin D (CALTRATE) 600-400 MG-UNIT TABS per tab Take 1 tablet by mouth daily      Cholecalciferol (VITAMIN D3) 1.25 MG (17817 UT) CAPS TAKE 1 CAPSULE BY MOUTH EVERY WEEK      pantoprazole (PROTONIX) 40 MG tablet TAKE ONE TABLET BY MOUTH EVERY DAY      valACYclovir (VALTREX) 1 g tablet valacyclovir 1 gram tablet   TAKE ONE TABLET BY MOUTH THREE TIMES A DAY AS DIRECTED      glimepiride (AMARYL) 4 MG tablet Take 4 mg by mouth every morning (before breakfast) bid      enoxaparin (LOVENOX) 300 MG/3ML injection Inject into the skin daily      Dulaglutide (TRULICITY SC) Inject into the skin      furosemide (LASIX) 20 MG tablet Take 20 mg by mouth 2 times daily      diclofenac (VOLTAREN) 75 MG EC tablet Take 75 mg by mouth 2 times daily      alendronate (FOSAMAX) 70 MG tablet Take 70 mg by mouth every 7 days      Turmeric 500 MG CAPS Take by mouth      calcium carbonate 600 MG TABS tablet Take 1 tablet by mouth daily      BIOTIN PO Take by mouth      pregabalin (LYRICA) 75 MG capsule Take 75 mg by mouth 2 times daily      Multiple Vitamins-Minerals (THERAPEUTIC MULTIVITAMIN-MINERALS) tablet Take 1 tablet by mouth daily Indications: with vision      ketorolac (TORADOL) 10 MG tablet Take 1 tablet by mouth every 6 hours as needed for Pain Patient had IM Toradol here in the ER. 12 tablet 0     No current facility-administered medications for this visit.        Patient Active Problem List   Diagnosis    Other osteoporosis without current pathological fracture    Senile osteoporosis       Past Medical History:   Diagnosis Date    Diabetes mellitus ([facility] [address]     IDDM    Osteoporosis     fosamax    Restless leg syndrome     S/P total knee replacement     left knee       Past Surgical History:   Procedure Laterality Date     SECTION      CHOLECYSTECTOMY      JOINT REPLACEMENT      bilaterally    KNEE SURGERY  2011    torn meniscus       No Known Allergies    Social History     Socioeconomic History    Marital status:      Spouse name: None    Number of children: None    Years of education: None    Highest education level: None   Occupational History    None   Tobacco Use    Smoking status: Never Smoker    Smokeless tobacco: Never Used   Substance and Sexual Activity    Alcohol use: No    Drug use: No    Sexual activity: Yes   Other Topics Concern    None   Social History Narrative    None     Social Determinants of Health     Financial Resource Strain:     Difficulty of Paying Living Expenses: Not on file   Food Insecurity:     Worried About Running Out of Food in the Last Year: Not on file    Ericka of Food in the Last Year: Not on file   Transportation Needs:     Lack of Transportation (Medical): Not on file    Lack of Transportation (Non-Medical):  Not on file   Physical Activity:     Days of Exercise per Week: Not on file    Minutes of Exercise per Session: Not on file   Stress:     Feeling of Stress : Not on file   Social Connections:     Frequency of Communication with Friends and Family: Not on file    Frequency of Social Gatherings with Friends and Family: Not on file    Attends Restorationist Services: Not on file    Active Member of [address] [place] [address] or Organizations: Not on file    Attends Club or Organization Meetings: Not on file    Marital Status: Not on file   Intimate Partner Violence:     Fear of Current or Ex-Partner: Not on file    Emotionally Abused: Not on file    Physically Abused: Not on file    Sexually Abused: Not on file   Housing Stability:     Unable to Pay for Housing in the Last Year: Not on file    Number of [place] in the Last Year: Not on file    Unstable Housing in the Last Year: Not on file       Review of Systems  As follows except as previously noted in HPI:  Constitutional: Negative for chills, diaphoresis, fatigue, fever and unexpected weight change. Respiratory: Negative for cough, shortness of breath and wheezing. Cardiovascular: Negative for chest pain and palpitations. Neurological: Negative for dizziness, syncope, cephalgia. GI / : negative  Musculoskeletal: see HPI       Objective:   Physical Exam   Constitutional: Oriented to person, place, and time. and appears well-developed and well-nourished. :   Head: Normocephalic and atraumatic. Eyes: EOM are normal.   Neck: Neck supple. Cardiovascular: Normal rate and regular rhythm. Pulmonary/Chest: Effort normal. No stridor. No respiratory distress, no wheezes. Abdominal:  No abnormal distension. Neurological: Alert and oriented to person, place, and time. Skin: Skin is warm and dry. Psychiatric: Normal mood and affect.  Behavior is normal. Thought content normal.    GORDON Hercules,     2/23/22  9:50 AM

## 2022-06-23 ENCOUNTER — HOSPITAL ENCOUNTER (OUTPATIENT)
Dept: GENERAL RADIOLOGY | Age: 67
Discharge: HOME | End: 2022-06-25
Payer: COMMERCIAL

## 2022-06-23 DIAGNOSIS — R92.1 CALCIFICATION OF BREAST: ICD-10-CM

## 2022-06-23 PROCEDURE — G0279 TOMOSYNTHESIS, MAMMO: HCPCS

## 2022-11-20 ENCOUNTER — HOSPITAL ENCOUNTER (EMERGENCY)
Age: 67
Discharge: HOME | End: 2022-11-20
Payer: COMMERCIAL

## 2022-11-20 ENCOUNTER — APPOINTMENT (OUTPATIENT)
Dept: GENERAL RADIOLOGY | Age: 67
End: 2022-11-20
Payer: COMMERCIAL

## 2022-11-20 VITALS
RESPIRATION RATE: 18 BRPM | HEART RATE: 68 BPM | WEIGHT: 180 LBS | SYSTOLIC BLOOD PRESSURE: 170 MMHG | TEMPERATURE: 98.1 F | OXYGEN SATURATION: 99 % | DIASTOLIC BLOOD PRESSURE: 72 MMHG | BODY MASS INDEX: 25.83 KG/M2

## 2022-11-20 DIAGNOSIS — S60.221A CONTUSION OF RIGHT HAND, INITIAL ENCOUNTER: ICD-10-CM

## 2022-11-20 DIAGNOSIS — S93.402A SPRAIN OF LEFT ANKLE, UNSPECIFIED LIGAMENT, INITIAL ENCOUNTER: ICD-10-CM

## 2022-11-20 DIAGNOSIS — S22.32XA CLOSED FRACTURE OF ONE RIB OF LEFT SIDE, INITIAL ENCOUNTER: Primary | ICD-10-CM

## 2022-11-20 PROCEDURE — 73610 X-RAY EXAM OF ANKLE: CPT

## 2022-11-20 PROCEDURE — 73130 X-RAY EXAM OF HAND: CPT

## 2022-11-20 PROCEDURE — 71101 X-RAY EXAM UNILAT RIBS/CHEST: CPT

## 2022-11-20 PROCEDURE — 99211 OFF/OP EST MAY X REQ PHY/QHP: CPT

## 2022-11-20 RX ORDER — TRAMADOL HYDROCHLORIDE 50 MG/1
50 TABLET ORAL EVERY 8 HOURS PRN
Qty: 15 TABLET | Refills: 0 | Status: SHIPPED | OUTPATIENT
Start: 2022-11-20 | End: 2022-11-25

## 2022-11-20 NOTE — DISCHARGE INSTRUCTIONS
May take Tylenol and/or ibuprofen for pain as directed. Use your ankle sleeve or Ace wrap for ankle sprain. Light duty until follow-up with occupational health.

## 2022-11-20 NOTE — ED PROVIDER NOTES
[facility]  Department of Emergency Medicine  UC Encounter Note  22   5:37 PM EST      NAME: Ambrocio Baird  :    MRN:  [id number]    Chief Complaint: Jorge Alberto Mezae Dannis Coronado over a picture at work injured, RIGHT hand, injured left side and leg, has hx of TKR on left and femur fractur)      This is a 76-year-old female the presents to urgent care with family. Patient states she was at work today and lost her balance and fell mostly complains of left rib cage pain but she also has pain to her right hand also to her left ankle. She has some mild aches and pains anywhere else but these are the 3 main areas where she is hurting. She denies any neck pain or head injury. No numbness or tingling. No no shortness of breath. On first contact patient she appears to be in no acute distress. Review of Systems  Pertinent positives and negatives are stated within HPI, all other systems reviewed and are negative. Physical Exam  Vitals and nursing note reviewed. Constitutional:       Appearance: She is well-developed. HENT:      Head: Normocephalic and atraumatic. Jaw: There is normal jaw occlusion. Right Ear: Hearing and external ear normal.      Left Ear: Hearing and external ear normal.      Nose: Nose normal.      Mouth/Throat:      Pharynx: Uvula midline. Eyes:      General: Lids are normal.      Conjunctiva/sclera: Conjunctivae normal.      Pupils: Pupils are equal, round, and reactive to light. Cardiovascular:      Rate and Rhythm: Normal rate and regular rhythm. Heart sounds: Normal heart sounds. No murmur heard. Pulmonary:      Effort: Pulmonary effort is normal.      Breath sounds: Normal breath sounds. Chest:      Chest wall: Tenderness present. Comments: Left mid lateral chest wall tenderness no open area no crepitus. No bruising. Abdominal:      General: Bowel sounds are normal.      Palpations: Abdomen is soft. Abdomen is not rigid. Tenderness: There is no abdominal tenderness. There is no guarding or rebound. Musculoskeletal:      Cervical back: Full passive range of motion without pain, normal range of motion and neck supple. Comments: Patient has bruising to the base of the palm of her right hand. No wrist pain on that side. Has palpable pedal pulses no open area no cyanosis has tenderness to left lateral ankle with some mild swelling but no bruising. No open area. Muscle strength bilaterally is 5-5 reflexes are brisk. Gait is steady. Skin:     General: Skin is warm and dry. Findings: No abrasion or rash. Neurological:      General: No focal deficit present. Mental Status: She is alert and oriented to person, place, and time. GCS: GCS eye subscore is 4. GCS verbal subscore is 5. GCS motor subscore is 6. Cranial Nerves: Cranial nerves 2-12 are intact. No cranial nerve deficit. Sensory: No sensory deficit. Motor: Motor function is intact. Coordination: Coordination is intact. Coordination normal.      Gait: Gait is intact. Gait normal.       Procedures    MDM  Number of Diagnoses or Management Options  Closed fracture of one rib of left side, initial encounter  Contusion of right hand, initial encounter  Sprain of left ankle, unspecified ligament, initial encounter  Diagnosis management comments: No acute distress. X-rays reviewed. Follow-up with occupational health. Light duty given. Take Tylenol and take ibuprofen add tramadol for breakthrough pain. Instructions given.             --------------------------------------------- PAST HISTORY ---------------------------------------------  Past Medical History:  has a past medical history of Diabetes mellitus ([facility] [address] Osteoporosis, Restless leg syndrome, and S/P total knee replacement. Past Surgical History:  has a past surgical history that includes Cholecystectomy;   section; knee surgery (2011); joint replacement; and fracture surgery. Social History:  reports that she has never smoked. She has never used smokeless tobacco. She reports that she does not drink alcohol and does not use drugs. Family History: family history is not on file. The patients home medications have been reviewed. Allergies: Patient has no known allergies. -------------------------------------------------- RESULTS -------------------------------------------------  No results found for this visit on 11/20/22. XR HAND RIGHT (MIN 3 VIEWS)   Final Result   No acute fracture dislocation. Degenerative changes as described. XR ANKLE LEFT (MIN 3 VIEWS)   Final Result   No acute osseous abnormality involving the left ankle. XR RIBS LEFT INCLUDE CHEST (MIN 3 VIEWS)   Final Result   1. No evidence of pneumonia or pleural effusion. 2. Subtle cortical irregularity involving lateral left 7th rib related to   fracture of uncertain chronicity.             ------------------------- NURSING NOTES AND VITALS REVIEWED ---------------------------   The nursing notes within the ED encounter and vital signs as below have been reviewed. BP (!) 170/72   Pulse 68   Temp 98.1 °F (36.7 °C)   Resp 18   Wt 180 lb (81.6 kg)   SpO2 99%   BMI 25.83 kg/m²   Oxygen Saturation Interpretation: Normal      ------------------------------------------ PROGRESS NOTES ------------------------------------------   I have spoken with the patient and discussed todays results, in addition to providing specific details for the plan of care and counseling regarding the diagnosis and prognosis. Their questions are answered at this time and they are agreeable with the plan.      --------------------------------- ADDITIONAL PROVIDER NOTES ---------------------------------     This patient is stable for discharge. I have shared the specific conditions for return, as well as the importance of follow-up.       * NOTE: This report was transcribed using voice recognition software. Every effort was made to ensure accuracy; however, inadvertent computerized transcription errors may be present.    --------------------------------- IMPRESSION AND DISPOSITION ---------------------------------    IMPRESSION  1. Closed fracture of one rib of left side, initial encounter    2. Sprain of left ankle, unspecified ligament, initial encounter    3.  Contusion of right hand, initial encounter        DISPOSITION  Disposition: Discharge to home  Patient condition is good       Josué Marte PA-C  11/20/22 [id number]

## 2022-11-22 LAB
ALBUMIN SERPL-MCNC: 4.4 G/DL (ref 3.5–5.2)
ALP BLD-CCNC: 48 U/L (ref 35–104)
ALT SERPL-CCNC: 34 U/L (ref 0–32)
ANION GAP SERPL CALCULATED.3IONS-SCNC: 13 MMOL/L (ref 7–16)
AST SERPL-CCNC: 22 U/L (ref 0–31)
BASOPHILS ABSOLUTE: 0.08 E9/L (ref 0–0.2)
BASOPHILS RELATIVE PERCENT: 1.1 % (ref 0–2)
BILIRUB SERPL-MCNC: 0.5 MG/DL (ref 0–1.2)
BUN BLDV-MCNC: 36 MG/DL (ref 6–23)
CALCIUM SERPL-MCNC: 9.3 MG/DL (ref 8.6–10.2)
CHLORIDE BLD-SCNC: 97 MMOL/L (ref 98–107)
CHOLESTEROL, TOTAL: 142 MG/DL (ref 0–199)
CO2: 26 MMOL/L (ref 22–29)
CREAT SERPL-MCNC: 1.2 MG/DL (ref 0.5–1)
CREATININE URINE: 158 MG/DL (ref 29–226)
EOSINOPHILS ABSOLUTE: 0.18 E9/L (ref 0.05–0.5)
EOSINOPHILS RELATIVE PERCENT: 2.5 % (ref 0–6)
GFR SERPL CREATININE-BSD FRML MDRD: 50 ML/MIN/1.73
GLUCOSE BLD-MCNC: 173 MG/DL (ref 74–99)
HBA1C MFR BLD: 8 % (ref 4–5.6)
HCT VFR BLD CALC: 38.8 % (ref 34–48)
HDLC SERPL-MCNC: 53 MG/DL
HEMOGLOBIN: 12 G/DL (ref 11.5–15.5)
IMMATURE GRANULOCYTES #: 0.01 E9/L
IMMATURE GRANULOCYTES %: 0.1 % (ref 0–5)
LDL CHOLESTEROL CALCULATED: 71 MG/DL (ref 0–99)
LYMPHOCYTES ABSOLUTE: 1.82 E9/L (ref 1.5–4)
LYMPHOCYTES RELATIVE PERCENT: 25.2 % (ref 20–42)
MCH RBC QN AUTO: 28.4 PG (ref 26–35)
MCHC RBC AUTO-ENTMCNC: 30.9 % (ref 32–34.5)
MCV RBC AUTO: 91.9 FL (ref 80–99.9)
MICROALBUMIN UR-MCNC: 26.9 MG/L
MICROALBUMIN/CREAT UR-RTO: 17 (ref 0–30)
MONOCYTES ABSOLUTE: 0.64 E9/L (ref 0.1–0.95)
MONOCYTES RELATIVE PERCENT: 8.9 % (ref 2–12)
NEUTROPHILS ABSOLUTE: 4.5 E9/L (ref 1.8–7.3)
NEUTROPHILS RELATIVE PERCENT: 62.2 % (ref 43–80)
PDW BLD-RTO: 14.5 FL (ref 11.5–15)
PLATELET # BLD: 258 E9/L (ref 130–450)
PMV BLD AUTO: 10.1 FL (ref 7–12)
POTASSIUM SERPL-SCNC: 4.8 MMOL/L (ref 3.5–5)
RBC # BLD: 4.22 E12/L (ref 3.5–5.5)
SODIUM BLD-SCNC: 136 MMOL/L (ref 132–146)
T4 FREE: 1.89 NG/DL (ref 0.93–1.7)
TOTAL PROTEIN: 7.7 G/DL (ref 6.4–8.3)
TRIGL SERPL-MCNC: 92 MG/DL (ref 0–149)
TSH SERPL DL<=0.05 MIU/L-ACNC: 3.29 UIU/ML (ref 0.27–4.2)
VLDLC SERPL CALC-MCNC: 18 MG/DL
WBC # BLD: 7.2 E9/L (ref 4.5–11.5)

## 2022-12-29 ENCOUNTER — HOSPITAL ENCOUNTER (OUTPATIENT)
Dept: GENERAL RADIOLOGY | Age: 67
Discharge: HOME | End: 2022-12-31
Payer: COMMERCIAL

## 2022-12-29 DIAGNOSIS — Z09 FOLLOW-UP EXAM, 3-6 MONTHS SINCE PREVIOUS EXAM: ICD-10-CM

## 2022-12-29 DIAGNOSIS — R92.1 BREAST CALCIFICATIONS: ICD-10-CM

## 2022-12-29 PROCEDURE — 77066 DX MAMMO INCL CAD BI: CPT

## 2023-03-01 ENCOUNTER — OFFICE VISIT (OUTPATIENT)
Dept: ORTHOPEDIC SURGERY | Age: 68
End: 2023-03-01

## 2023-03-01 VITALS — WEIGHT: 185 LBS | TEMPERATURE: 98 F | HEIGHT: 70 IN | BODY MASS INDEX: 26.48 KG/M2

## 2023-03-01 DIAGNOSIS — M25.552 LEFT HIP PAIN: Primary | ICD-10-CM

## 2023-03-01 DIAGNOSIS — S72.142G CLOSED DISPLACED INTERTROCHANTERIC FRACTURE OF LEFT FEMUR WITH DELAYED HEALING, SUBSEQUENT ENCOUNTER: ICD-10-CM

## 2023-03-01 DIAGNOSIS — M25.562 CHRONIC PAIN OF LEFT KNEE: ICD-10-CM

## 2023-03-01 DIAGNOSIS — G89.29 CHRONIC PAIN OF LEFT KNEE: ICD-10-CM

## 2023-03-01 RX ORDER — TRIAMCINOLONE ACETONIDE 40 MG/ML
40 INJECTION, SUSPENSION INTRA-ARTICULAR; INTRAMUSCULAR ONCE
Status: COMPLETED | OUTPATIENT
Start: 2023-03-01 | End: 2023-03-01

## 2023-03-01 RX ADMIN — TRIAMCINOLONE ACETONIDE 40 MG: 40 INJECTION, SUSPENSION INTRA-ARTICULAR; INTRAMUSCULAR at 09:17

## 2023-03-16 LAB
ALBUMIN SERPL-MCNC: 4 G/DL (ref 3.5–5.2)
ALP SERPL-CCNC: 46 U/L (ref 35–104)
ALT SERPL-CCNC: 24 U/L (ref 0–32)
ANION GAP SERPL CALCULATED.3IONS-SCNC: 13 MMOL/L (ref 7–16)
AST SERPL-CCNC: 21 U/L (ref 0–31)
BILIRUB SERPL-MCNC: 0.3 MG/DL (ref 0–1.2)
BUN SERPL-MCNC: 39 MG/DL (ref 6–23)
CALCIUM SERPL-MCNC: 9.4 MG/DL (ref 8.6–10.2)
CHLORIDE SERPL-SCNC: 107 MMOL/L (ref 98–107)
CHOLESTEROL, TOTAL: 138 MG/DL (ref 0–199)
CO2 SERPL-SCNC: 24 MMOL/L (ref 22–29)
CREAT SERPL-MCNC: 1 MG/DL (ref 0.5–1)
GLUCOSE SERPL-MCNC: 151 MG/DL (ref 74–99)
HBA1C MFR BLD: 7.5 % (ref 4–5.6)
HDLC SERPL-MCNC: 56 MG/DL
LDLC SERPL CALC-MCNC: 64 MG/DL (ref 0–99)
POTASSIUM SERPL-SCNC: 5.1 MMOL/L (ref 3.5–5)
PROT SERPL-MCNC: 7 G/DL (ref 6.4–8.3)
SODIUM SERPL-SCNC: 144 MMOL/L (ref 132–146)
T4 FREE SERPL-MCNC: 1.65 NG/DL (ref 0.93–1.7)
TRIGL SERPL-MCNC: 89 MG/DL (ref 0–149)
TSH SERPL-MCNC: 2.92 UIU/ML (ref 0.27–4.2)
VLDLC SERPL CALC-MCNC: 18 MG/DL

## 2023-05-05 ENCOUNTER — OFFICE VISIT (OUTPATIENT)
Dept: ORTHOPEDIC SURGERY | Age: 68
End: 2023-05-05
Payer: COMMERCIAL

## 2023-05-05 VITALS — TEMPERATURE: 98 F | WEIGHT: 185 LBS | BODY MASS INDEX: 26.48 KG/M2 | HEIGHT: 70 IN

## 2023-05-05 DIAGNOSIS — M25.552 LEFT HIP PAIN: Primary | ICD-10-CM

## 2023-05-05 PROCEDURE — 1123F ACP DISCUSS/DSCN MKR DOCD: CPT | Performed by: ORTHOPAEDIC SURGERY

## 2023-05-05 PROCEDURE — 99213 OFFICE O/P EST LOW 20 MIN: CPT | Performed by: ORTHOPAEDIC SURGERY

## 2023-05-05 NOTE — PROGRESS NOTES
Chief Complaint:   Chief Complaint   Patient presents with    Hip Pain     Left hip f/u had injection 3/1/23 and it did not help. Pain where the pin is. Galindo Lobo follows up regarding her left hip. She still has pain in the lateral hip area. This is proximal around the greater trochanter. The fracture was about 3 years ago and was intertrochanteric. She suffered this when she was in [place] and had the surgery for fixation in [place]. She had followed up with us since that time. More recently she had a shot in the greater trochanteric area by me which unfortunately did not help. She still has left hip pain. She was in physical therapy for another problem and hope that would help but unfortunately it has not. Allergies; medications; past medical, surgical, family, and social history; and problem list have been reviewed today and updated as indicated in this encounter seen below. Exam: Left knee range of motion is good with no pain or instability. Left hip tenderness is present with motion and palpation around the greater trochanter. There is no pain around the entry point of the lag screw in the cephalomedullary device. She has a little bit of a lurch when she walks having lost some length and have a little bit of weakness in her abductors yet. Part of this is because of her continued discomfort. Radiographs: Previous imaging was reviewed and showed containment of the lag screw in the femoral head. The intramedullary portion was stable as well. There was collapse along the designed axis of the device and x-rays today show that the intertrochanteric fracture has healed. There is some heterotopic bone around the greater trochanter which is become a little more dense and perhaps slightly larger. No other changes are noted. ASSESSMENT:    hospitals was seen today for hip pain.     Diagnoses and all orders for this visit:    Left hip pain  -     XR HIP LEFT (2-3 VIEWS);

## 2023-06-30 LAB
ALBUMIN SERPL-MCNC: 4.4 G/DL (ref 3.5–5.2)
ALP SERPL-CCNC: 41 U/L (ref 35–104)
ALT SERPL-CCNC: 34 U/L (ref 0–32)
ANION GAP SERPL CALCULATED.3IONS-SCNC: 16 MMOL/L (ref 7–16)
AST SERPL-CCNC: 24 U/L (ref 0–31)
BILIRUB SERPL-MCNC: 0.3 MG/DL (ref 0–1.2)
BUN SERPL-MCNC: 33 MG/DL (ref 6–23)
CALCIUM SERPL-MCNC: 9.4 MG/DL (ref 8.6–10.2)
CHLORIDE SERPL-SCNC: 105 MMOL/L (ref 98–107)
CHOLESTEROL, TOTAL: 135 MG/DL (ref 0–199)
CO2 SERPL-SCNC: 22 MMOL/L (ref 22–29)
CREAT SERPL-MCNC: 1 MG/DL (ref 0.5–1)
GLUCOSE SERPL-MCNC: 126 MG/DL (ref 74–99)
HBA1C MFR BLD: 7.7 % (ref 4–5.6)
HDLC SERPL-MCNC: 50 MG/DL
LDLC SERPL CALC-MCNC: 64 MG/DL (ref 0–99)
POTASSIUM SERPL-SCNC: 4.9 MMOL/L (ref 3.5–5)
PROT SERPL-MCNC: 7.3 G/DL (ref 6.4–8.3)
SODIUM SERPL-SCNC: 143 MMOL/L (ref 132–146)
TRIGL SERPL-MCNC: 106 MG/DL (ref 0–149)
VITAMIN D 25-HYDROXY: 42 NG/ML (ref 30–100)
VLDLC SERPL CALC-MCNC: 21 MG/DL

## 2023-10-26 LAB
ALBUMIN SERPL-MCNC: 4.4 G/DL (ref 3.5–5.2)
ALP SERPL-CCNC: 51 U/L (ref 35–104)
ALT SERPL-CCNC: 46 U/L (ref 0–32)
ANION GAP SERPL CALCULATED.3IONS-SCNC: 18 MMOL/L (ref 7–16)
AST SERPL-CCNC: 32 U/L (ref 0–31)
BILIRUB SERPL-MCNC: 0.2 MG/DL (ref 0–1.2)
BUN SERPL-MCNC: 27 MG/DL (ref 6–23)
CALCIUM SERPL-MCNC: 9.8 MG/DL (ref 8.6–10.2)
CHLORIDE SERPL-SCNC: 105 MMOL/L (ref 98–107)
CHOLEST SERPL-MCNC: 174 MG/DL
CO2 SERPL-SCNC: 21 MMOL/L (ref 22–29)
CREAT SERPL-MCNC: 0.9 MG/DL (ref 0.5–1)
CREAT UR-MCNC: 90.2 MG/DL (ref 29–226)
GFR SERPL CREATININE-BSD FRML MDRD: >60 ML/MIN/1.73M2
GLUCOSE SERPL-MCNC: 156 MG/DL (ref 74–99)
HBA1C MFR BLD: 9.1 % (ref 4–5.6)
HDLC SERPL-MCNC: 51 MG/DL
LDLC SERPL CALC-MCNC: 96 MG/DL
MICROALBUMIN UR-MCNC: 100 MG/L (ref 0–19)
MICROALBUMIN/CREAT UR-RTO: 111 MCG/MG CREAT (ref 0–30)
POTASSIUM SERPL-SCNC: 5 MMOL/L (ref 3.5–5)
PROT SERPL-MCNC: 7.3 G/DL (ref 6.4–8.3)
SODIUM SERPL-SCNC: 144 MMOL/L (ref 132–146)
TRIGL SERPL-MCNC: 136 MG/DL
VLDLC SERPL CALC-MCNC: 27 MG/DL

## 2024-01-03 ENCOUNTER — HOSPITAL ENCOUNTER (OUTPATIENT)
Dept: GENERAL RADIOLOGY | Age: 69
Discharge: HOME | End: 2024-01-05
Payer: MEDICARE

## 2024-01-03 VITALS — WEIGHT: 185 LBS | BODY MASS INDEX: 26.48 KG/M2 | HEIGHT: 70 IN

## 2024-01-03 DIAGNOSIS — R92.8 ABNORMAL MAMMOGRAM: ICD-10-CM

## 2024-01-03 PROCEDURE — G0279 TOMOSYNTHESIS, MAMMO: HCPCS

## 2024-02-28 ENCOUNTER — HOSPITAL ENCOUNTER (OUTPATIENT)
Dept: INFUSION THERAPY | Age: 69
Setting detail: INFUSION SERIES
Discharge: HOME | End: 2024-02-28
Payer: MEDICARE

## 2024-02-28 VITALS
HEART RATE: 70 BPM | TEMPERATURE: 97 F | RESPIRATION RATE: 24 BRPM | DIASTOLIC BLOOD PRESSURE: 70 MMHG | OXYGEN SATURATION: 97 % | SYSTOLIC BLOOD PRESSURE: 150 MMHG

## 2024-02-28 DIAGNOSIS — M81.8 OTHER OSTEOPOROSIS WITHOUT CURRENT PATHOLOGICAL FRACTURE: Primary | ICD-10-CM

## 2024-02-28 PROCEDURE — 96372 THER/PROPH/DIAG INJ SC/IM: CPT

## 2024-02-28 PROCEDURE — 6360000002 HC RX W HCPCS: Performed by: OBSTETRICS & GYNECOLOGY

## 2024-02-28 RX ORDER — DICYCLOMINE HYDROCHLORIDE 10 MG/1
10 CAPSULE ORAL
COMMUNITY

## 2024-02-28 RX ADMIN — DENOSUMAB 60 MG: 60 INJECTION SUBCUTANEOUS at 12:54

## 2024-02-29 LAB
25(OH)D3 SERPL-MCNC: 50.4 NG/ML (ref 30–100)
ALBUMIN SERPL-MCNC: 4.1 G/DL (ref 3.5–5.2)
ALP SERPL-CCNC: 40 U/L (ref 35–104)
ALT SERPL-CCNC: 41 U/L (ref 0–32)
ANION GAP SERPL CALCULATED.3IONS-SCNC: 15 MMOL/L (ref 7–16)
AST SERPL-CCNC: 27 U/L (ref 0–31)
BILIRUB SERPL-MCNC: 0.4 MG/DL (ref 0–1.2)
BUN SERPL-MCNC: 29 MG/DL (ref 6–23)
CALCIUM SERPL-MCNC: 9.5 MG/DL (ref 8.6–10.2)
CHLORIDE SERPL-SCNC: 105 MMOL/L (ref 98–107)
CO2 SERPL-SCNC: 23 MMOL/L (ref 22–29)
CREAT SERPL-MCNC: 0.9 MG/DL (ref 0.5–1)
GFR SERPL CREATININE-BSD FRML MDRD: >60 ML/MIN/1.73M2
GLUCOSE SERPL-MCNC: 78 MG/DL (ref 74–99)
HBA1C MFR BLD: 7.3 % (ref 4–5.6)
POTASSIUM SERPL-SCNC: 4.8 MMOL/L (ref 3.5–5)
PROT SERPL-MCNC: 7.3 G/DL (ref 6.4–8.3)
SODIUM SERPL-SCNC: 143 MMOL/L (ref 132–146)
VIT B12 SERPL-MCNC: >2000 PG/ML (ref 211–946)

## 2024-04-29 NOTE — DISCHARGE INSTRUCTIONS
Visit Discharge/Physician Orders    Discharge condition: Stable    Assessment of pain at discharge: minimal    Anesthetic used: 4% lidocaine    Discharge to: Home    Left via:Private automobile    Accompanied by: n/a    ECF/HHA:     Dressing Orders: Left leg: cleanse wound with normal saline, apply Mupirocin ointment (being sent to pharmacy), cover with adaptic and silicone border. Change daily.  Double tubigrip on in the morning and remove at night. Hand wash and air dry.    Treatment Orders: Elevate legs above heart level as much as possible.  Culture taken 4/30    [facility] followup visit _______1 week______________________  (Please note your next appointment above and if you are unable to keep, kindly give a 24 hour notice. Thank you.)    Physician signature:__________________________      If you experience any of the following, please call the Wound Care Center during business hours:    * Increase in Pain  * Temperature over 101  * Increase in drainage from your wound  * Drainage with a foul odor  * Bleeding  * Increase in swelling  * Need for compression bandage changes due to slippage, breakthrough drainage.    If you need medical attention outside of the business hours of the Wound Care Centers please contact your PCP or go to the nearest emergency room.

## 2024-04-30 ENCOUNTER — HOSPITAL ENCOUNTER (OUTPATIENT)
Dept: WOUND CARE | Age: 69
Discharge: HOME | End: 2024-04-30
Attending: PODIATRIST
Payer: MEDICARE

## 2024-04-30 VITALS
TEMPERATURE: 96.8 F | HEART RATE: 72 BPM | BODY MASS INDEX: 26.48 KG/M2 | DIASTOLIC BLOOD PRESSURE: 91 MMHG | WEIGHT: 185 LBS | SYSTOLIC BLOOD PRESSURE: 137 MMHG | HEIGHT: 70 IN | RESPIRATION RATE: 18 BRPM

## 2024-04-30 DIAGNOSIS — I89.0 LYMPHEDEMA: ICD-10-CM

## 2024-04-30 DIAGNOSIS — L97.922 NON-PRESSURE CHRONIC ULCER OF LEFT LOWER LEG WITH FAT LAYER EXPOSED (HCC): Primary | ICD-10-CM

## 2024-04-30 PROCEDURE — 99213 OFFICE O/P EST LOW 20 MIN: CPT

## 2024-04-30 PROCEDURE — 11042 DBRDMT SUBQ TIS 1ST 20SQCM/<: CPT

## 2024-04-30 PROCEDURE — 87070 CULTURE OTHR SPECIMN AEROBIC: CPT

## 2024-04-30 PROCEDURE — 87205 SMEAR GRAM STAIN: CPT

## 2024-04-30 RX ORDER — ZINC SULFATE 50(220)MG
50 CAPSULE ORAL DAILY
COMMUNITY

## 2024-04-30 RX ORDER — ASCORBIC ACID 500 MG
500 TABLET ORAL DAILY
COMMUNITY

## 2024-04-30 RX ORDER — LISINOPRIL 5 MG/1
5 TABLET ORAL DAILY
COMMUNITY

## 2024-04-30 RX ORDER — LUTEIN 10 MG
5 TABLET ORAL DAILY
COMMUNITY

## 2024-04-30 RX ORDER — LIDOCAINE HYDROCHLORIDE 40 MG/ML
SOLUTION TOPICAL ONCE
Status: COMPLETED | OUTPATIENT
Start: 2024-04-30 | End: 2024-04-30

## 2024-04-30 RX ORDER — LANOLIN ALCOHOL/MO/W.PET/CERES
5000 CREAM (GRAM) TOPICAL DAILY
COMMUNITY

## 2024-04-30 RX ORDER — MAGNESIUM 30 MG
400 TABLET ORAL DAILY
COMMUNITY

## 2024-04-30 RX ORDER — INSULIN GLARGINE 100 [IU]/ML
18 INJECTION, SOLUTION SUBCUTANEOUS NIGHTLY
COMMUNITY

## 2024-04-30 RX ADMIN — LIDOCAINE HYDROCHLORIDE 10 ML: 40 SOLUTION TOPICAL at 09:56

## 2024-04-30 ASSESSMENT — PAIN DESCRIPTION - LOCATION: LOCATION: LEG

## 2024-04-30 ASSESSMENT — PAIN DESCRIPTION - ORIENTATION: ORIENTATION: LEFT

## 2024-04-30 ASSESSMENT — PAIN SCALES - GENERAL: PAINLEVEL_OUTOF10: 4

## 2024-04-30 ASSESSMENT — PAIN DESCRIPTION - DESCRIPTORS: DESCRIPTORS: THROBBING

## 2024-04-30 NOTE — DISCHARGE INSTRUCTIONS
Visit Discharge/Physician Orders     Discharge condition: Stable     Assessment of pain at discharge: minimal     Anesthetic used: 4% lidocaine     Discharge to: Home     Left via:Private automobile     Accompanied by: n/a     ECF/HHA:      Dressing Orders: Left leg: cleanse wound with normal saline, apply Mupirocin ointment, cover with adaptic and silicone border. Change daily.  Double tubigrip on in the morning and remove at night. Hand wash and air dry.     Treatment Orders: Elevate legs above heart level as much as possible.     [facility] followup visit _______1 week______________________  (Please note your next appointment above and if you are unable to keep, kindly give a 24 hour notice. Thank you.)     Physician signature:__________________________        If you experience any of the following, please call the Wound Care Center during business hours:     * Increase in Pain  * Temperature over 101  * Increase in drainage from your wound  * Drainage with a foul odor  * Bleeding  * Increase in swelling  * Need for compression bandage changes due to slippage, breakthrough drainage.     If you need medical attention outside of the business hours of the Wound Care Centers please contact your PCP or go to the nearest emergency room.

## 2024-05-02 LAB
MICROORGANISM SPEC CULT: NO GROWTH
MICROORGANISM/AGENT SPEC: NORMAL
SPECIMEN DESCRIPTION: NORMAL

## 2024-05-07 ENCOUNTER — HOSPITAL ENCOUNTER (OUTPATIENT)
Dept: WOUND CARE | Age: 69
Discharge: HOME | End: 2024-05-07
Attending: PODIATRIST
Payer: MEDICARE

## 2024-05-07 VITALS
HEART RATE: 70 BPM | TEMPERATURE: 96.8 F | HEIGHT: 70 IN | RESPIRATION RATE: 18 BRPM | BODY MASS INDEX: 26.48 KG/M2 | SYSTOLIC BLOOD PRESSURE: 166 MMHG | DIASTOLIC BLOOD PRESSURE: 69 MMHG | WEIGHT: 185 LBS

## 2024-05-07 PROCEDURE — 11042 DBRDMT SUBQ TIS 1ST 20SQCM/<: CPT

## 2024-05-07 RX ORDER — LIDOCAINE HYDROCHLORIDE 40 MG/ML
SOLUTION TOPICAL ONCE
Status: COMPLETED | OUTPATIENT
Start: 2024-05-07 | End: 2024-05-07

## 2024-05-07 RX ADMIN — LIDOCAINE HYDROCHLORIDE 5 ML: 40 SOLUTION TOPICAL at 09:42

## 2024-05-13 NOTE — DISCHARGE INSTRUCTIONS
Visit Discharge/Physician Orders     Discharge condition: Stable     Assessment of pain at discharge: minimal     Anesthetic used: 4% lidocaine     Discharge to: Home     Left via:Private automobile     Accompanied by: n/a     ECF/HHA:      Dressing Orders: Left leg: cleanse wound with normal saline, apply Mupirocin ointment, cover with plain collagen and silicone border. Change daily.    Double tubigrip on in the morning and remove at night. Hand wash and air dry.     Treatment Orders: Elevate legs above heart level as much as possible.     [facility] followup visit _______1 week______________________  (Please note your next appointment above and if you are unable to keep, kindly give a 24 hour notice. Thank you.)     Physician signature:__________________________        If you experience any of the following, please call the Wound Care Center during business hours:     * Increase in Pain  * Temperature over 101  * Increase in drainage from your wound  * Drainage with a foul odor  * Bleeding  * Increase in swelling  * Need for compression bandage changes due to slippage, breakthrough drainage.     If you need medical attention outside of the business hours of the Wound Care Centers please contact your PCP or go to the nearest emergency room.

## 2024-05-14 ENCOUNTER — HOSPITAL ENCOUNTER (OUTPATIENT)
Dept: WOUND CARE | Age: 69
Discharge: HOME | End: 2024-05-14
Attending: PODIATRIST
Payer: MEDICARE

## 2024-05-14 VITALS
HEIGHT: 70 IN | HEART RATE: 71 BPM | SYSTOLIC BLOOD PRESSURE: 168 MMHG | DIASTOLIC BLOOD PRESSURE: 54 MMHG | BODY MASS INDEX: 26.48 KG/M2 | WEIGHT: 185 LBS | TEMPERATURE: 96.7 F | RESPIRATION RATE: 18 BRPM

## 2024-05-14 DIAGNOSIS — L97.922 NON-PRESSURE CHRONIC ULCER OF LEFT LOWER LEG WITH FAT LAYER EXPOSED (HCC): Primary | ICD-10-CM

## 2024-05-14 PROCEDURE — 11042 DBRDMT SUBQ TIS 1ST 20SQCM/<: CPT

## 2024-05-14 PROCEDURE — 11042 DBRDMT SUBQ TIS 1ST 20SQCM/<: CPT | Performed by: NURSE PRACTITIONER

## 2024-05-14 RX ORDER — LIDOCAINE HYDROCHLORIDE 40 MG/ML
SOLUTION TOPICAL ONCE
Status: COMPLETED | OUTPATIENT
Start: 2024-05-14 | End: 2024-05-14

## 2024-05-14 RX ADMIN — LIDOCAINE HYDROCHLORIDE: 40 SOLUTION TOPICAL at 09:39

## 2024-05-14 NOTE — PROGRESS NOTES
Wound Healing Center Followup Visit Note    Referring Physician : Shamir Young DO  Elizabeth Lewis  MEDICAL RECORD NUMBER:  [id number]  AGE: 68 y.o.   GENDER: female  : 1955  EPISODE DATE:  2024    Subjective:     Chief Complaint   Patient presents with    Wound Check     LLE      HISTORY of PRESENT ILLNESS HPI   Elizabeth Lewis is a 68 y.o. female who presents today in regards to follow up evaluation and treatment of wound/ulcer.  That patient's past medical, family and social hx were reviewed and changes were made if present.    History of Wound Context:      24  Wound measuring slightly smaller   Wound debrided   Continue  Mupirocin ointment and promogram     Wound/Ulcer Pain Timing/Severity: none  Quality of pain: N/A  Severity:  0 / 10   Modifying Factors: None  Associated Signs/Symptoms: edema and erythema    Ulcer Identification:  Ulcer Type: traumatic  Contributing Factors: edema and diabetes    Diabetic/Pressure/Non Pressure Ulcers only:  Ulcer: Non-Pressure ulcer, fat layer exposed    Wound: N/A        PAST MEDICAL HISTORY      Diagnosis Date    Diabetes mellitus (HCC)     IDDM    Osteoporosis     fosamax    Restless leg syndrome     S/P total knee replacement     left knee     Past Surgical History:   Procedure Laterality Date    BREAST BIOPSY       SECTION      CHOLECYSTECTOMY      FRACTURE SURGERY      JOINT REPLACEMENT      bilaterally    KNEE SURGERY  2011    torn meniscus     Family History   Problem Relation Age of Onset    Uterine Cancer Mother     Breast Cancer Mother 65     Social History     Tobacco Use    Smoking status: Never    Smokeless tobacco: Never   Substance Use Topics    Alcohol use: No    Drug use: No     No Known Allergies  Current Outpatient Medications on File Prior to Encounter   Medication Sig Dispense Refill    diclofenac sodium (VOLTAREN) 1 % GEL Apply topically 2 times daily      lisinopril (PRINIVIL;ZESTRIL) 5 MG tablet Take 1 tablet by

## 2024-05-28 ENCOUNTER — HOSPITAL ENCOUNTER (OUTPATIENT)
Dept: WOUND CARE | Age: 69
Discharge: HOME | End: 2024-05-28
Attending: PODIATRIST
Payer: MEDICARE

## 2024-05-28 VITALS
HEART RATE: 71 BPM | RESPIRATION RATE: 18 BRPM | DIASTOLIC BLOOD PRESSURE: 71 MMHG | TEMPERATURE: 97.3 F | SYSTOLIC BLOOD PRESSURE: 150 MMHG

## 2024-05-28 DIAGNOSIS — L97.922 NON-PRESSURE CHRONIC ULCER OF LEFT LOWER LEG WITH FAT LAYER EXPOSED (HCC): Primary | ICD-10-CM

## 2024-05-28 DIAGNOSIS — I89.0 LYMPHEDEMA: ICD-10-CM

## 2024-05-28 PROCEDURE — 11042 DBRDMT SUBQ TIS 1ST 20SQCM/<: CPT

## 2024-05-28 RX ORDER — LIDOCAINE HYDROCHLORIDE 40 MG/ML
SOLUTION TOPICAL ONCE
Status: COMPLETED | OUTPATIENT
Start: 2024-05-28 | End: 2024-05-28

## 2024-05-28 RX ADMIN — LIDOCAINE HYDROCHLORIDE 5 ML: 40 SOLUTION TOPICAL at 10:04

## 2024-05-28 NOTE — DISCHARGE INSTRUCTIONS
Visit Discharge/Physician Orders     Discharge condition: Stable     Assessment of pain at discharge: minimal     Anesthetic used: 4% lidocaine     Discharge to: Home     Left via:Private automobile     Accompanied by: n/a     ECF/HHA:      Dressing Orders: Left leg: cleanse wound with normal saline, apply Mupirocin ointment, cover with plain collagen and silicone border. Change daily.  Apply steri strips as directed     Double tubigrip on in the morning and remove at night. Hand wash and air dry.     Treatment Orders: Elevate legs above heart level as much as possible.     [facility] followup visit _______2 weeks______________________  (Please note your next appointment above and if you are unable to keep, kindly give a 24 hour notice. Thank you.)     Physician signature:__________________________        If you experience any of the following, please call the Wound Care Center during business hours:     * Increase in Pain  * Temperature over 101  * Increase in drainage from your wound  * Drainage with a foul odor  * Bleeding  * Increase in swelling  * Need for compression bandage changes due to slippage, breakthrough drainage.     If you need medical attention outside of the business hours of the Wound Care Centers please contact your PCP or go to the nearest emergency room.

## 2024-05-28 NOTE — PROGRESS NOTES
required to promote healing and evaluate the wound base.    Performed by: Hamlet Ayala DPM    Consent obtained:  Yes    Time out taken:  Yes    Pain Control: Anesthetic  Anesthetic: 4% Lidocaine Liquid Topical     Debridement:Excisional Debridement    Using curette the wound(s)/ulcer(s) was/were sharply debrided down through and including the removal of dermis and subcutaneous tissue.        Devitalized Tissue Debrided:  fibrin, biofilm, slough, and exudate to stimulate bleeding to promote healing, post debridement good bleeding base and wound edges noted    Wound/Ulcer #: 1    Percent of Wound/Ulcer Debrided: 100%    Total Surface Area Debrided:  0.16 sq cm     Estimated Blood Loss:  Minimal  Hemostasis Achieved:  by pressure    Procedural Pain:  1  / 10   Post Procedural Pain:  1 / 10     Response to treatment:  Well tolerated by patient.     Plan:   Treatment Note please see attached Discharge Instructions    Written patient dismissal instructions given to patient and signed by patient or POA.         Discharge Instructions         Visit Discharge/Physician Orders     Discharge condition: Stable     Assessment of pain at discharge: minimal     Anesthetic used: 4% lidocaine     Discharge to: Home     Left via:Private automobile     Accompanied by: n/a     ECF/HHA:      Dressing Orders: Left leg: cleanse wound with normal saline, apply Mupirocin ointment, cover with plain collagen and silicone border. Change daily.  Apply steri strips as directed     Double tubigrip on in the morning and remove at night. Hand wash and air dry.     Treatment Orders: Elevate legs above heart level as much as possible.     [facility] followup visit _______2 weeks______________________  (Please note your next appointment above and if you are unable to keep, kindly give a 24 hour notice. Thank you.)     Physician signature:__________________________        If you experience any of the following, please call the Wound Care Center during

## 2024-06-10 NOTE — DISCHARGE INSTRUCTIONS
Visit Discharge/Physician Orders     Discharge condition: Stable     Assessment of pain at discharge: minimal     Anesthetic used: 4% lidocaine     Discharge to: Home     Left via:Private automobile     Accompanied by: n/a     ECF/HHA:      Dressing Orders: Left leg: healed  Double tubigrip on in the morning and remove at night. Hand wash and air dry.     Treatment Orders: Elevate legs above heart level as much as possible.     [facility] followup visit _______discharge______________________  (Please note your next appointment above and if you are unable to keep, kindly give a 24 hour notice. Thank you.)     Physician signature:__________________________        If you experience any of the following, please call the Wound Care Center during business hours:     * Increase in Pain  * Temperature over 101  * Increase in drainage from your wound  * Drainage with a foul odor  * Bleeding  * Increase in swelling  * Need for compression bandage changes due to slippage, breakthrough drainage.     If you need medical attention outside of the business hours of the Wound Care Centers please contact your PCP or go to the nearest emergency room.

## 2024-06-11 ENCOUNTER — HOSPITAL ENCOUNTER (OUTPATIENT)
Dept: WOUND CARE | Age: 69
Discharge: HOME | End: 2024-06-11
Attending: PODIATRIST
Payer: MEDICARE

## 2024-06-11 VITALS
TEMPERATURE: 97 F | DIASTOLIC BLOOD PRESSURE: 77 MMHG | HEART RATE: 73 BPM | WEIGHT: 185 LBS | BODY MASS INDEX: 26.48 KG/M2 | HEIGHT: 70 IN | SYSTOLIC BLOOD PRESSURE: 159 MMHG | RESPIRATION RATE: 18 BRPM

## 2024-06-11 DIAGNOSIS — L97.922 NON-PRESSURE CHRONIC ULCER OF LEFT LOWER LEG WITH FAT LAYER EXPOSED (HCC): Primary | ICD-10-CM

## 2024-06-11 PROCEDURE — 99211 OFF/OP EST MAY X REQ PHY/QHP: CPT

## 2024-06-11 NOTE — PROGRESS NOTES
Wound Healing Center Followup Visit Note    Referring Physician : Shamir Young DO  Elizabeth Lewis  MEDICAL RECORD NUMBER:  [id number]  AGE: 68 y.o.   GENDER: female  : 1955  EPISODE DATE:  2024    Subjective:     Chief Complaint   Patient presents with    Wound Check     L leg      HISTORY of PRESENT ILLNESS HPI   Elizabeth Lewis is a 68 y.o. female who presents today in regards to follow up evaluation and treatment of wound/ulcer.  That patient's past medical, family and social hx were reviewed and changes were made if present.    History of Wound Context:      24: Patient with right leg ulcer. Mupirocin and adaptic. Change daily. F/u 1 week.     24: Patient with right leg ulcer. Collagen. Change daily. F/u 2 weeks.     24: Patient with right leg ulcer, healed without signs of infection. Patient to apply moisturizing lotion and inspect leg for signs of infection or open lesions patient to seek out medical care prn. Patient d/c from .    Wound/Ulcer Pain Timing/Severity: none  Quality of pain: N/A  Severity:  0 / 10   Modifying Factors: None  Associated Signs/Symptoms: edema and erythema    Ulcer Identification:  Ulcer Type: traumatic  Contributing Factors: edema and diabetes    Diabetic/Pressure/Non Pressure Ulcers only:  Ulcer: Non-Pressure ulcer, fat layer exposed    Wound: N/A        PAST MEDICAL HISTORY      Diagnosis Date    Diabetes mellitus (HCC)     IDDM    Osteoporosis     fosamax    Restless leg syndrome     S/P total knee replacement     left knee     Past Surgical History:   Procedure Laterality Date    BREAST BIOPSY       SECTION      CHOLECYSTECTOMY      FRACTURE SURGERY      JOINT REPLACEMENT      bilaterally    KNEE SURGERY  2011    torn meniscus     Family History   Problem Relation Age of Onset    Uterine Cancer Mother     Breast Cancer Mother 65     Social History     Tobacco Use    Smoking status: Never    Smokeless tobacco: Never   Substance Use

## 2024-07-16 LAB
ALBUMIN SERPL-MCNC: 4.2 G/DL (ref 3.5–5.2)
ALP SERPL-CCNC: 37 U/L (ref 35–104)
ALT SERPL-CCNC: 38 U/L (ref 0–32)
ANION GAP SERPL CALCULATED.3IONS-SCNC: 12 MMOL/L (ref 7–16)
AST SERPL-CCNC: 25 U/L (ref 0–31)
BILIRUB SERPL-MCNC: 0.4 MG/DL (ref 0–1.2)
BUN SERPL-MCNC: 32 MG/DL (ref 6–23)
CALCIUM SERPL-MCNC: 9.6 MG/DL (ref 8.6–10.2)
CHLORIDE SERPL-SCNC: 104 MMOL/L (ref 98–107)
CO2 SERPL-SCNC: 24 MMOL/L (ref 22–29)
CREAT SERPL-MCNC: 1.1 MG/DL (ref 0.5–1)
CREAT UR-MCNC: 120.3 MG/DL (ref 29–226)
GFR, ESTIMATED: 56 ML/MIN/1.73M2
GLUCOSE SERPL-MCNC: 104 MG/DL (ref 74–99)
HBA1C MFR BLD: 7.7 % (ref 4–5.6)
MICROALBUMIN UR-MCNC: 23 MG/L (ref 0–19)
MICROALBUMIN/CREAT UR-RTO: 19 MCG/MG CREAT (ref 0–30)
POTASSIUM SERPL-SCNC: 5.4 MMOL/L (ref 3.5–5)
PROT SERPL-MCNC: 7.5 G/DL (ref 6.4–8.3)
SODIUM SERPL-SCNC: 140 MMOL/L (ref 132–146)

## 2024-08-28 ENCOUNTER — HOSPITAL ENCOUNTER (OUTPATIENT)
Dept: INFUSION THERAPY | Age: 69
Setting detail: INFUSION SERIES
Discharge: HOME | End: 2024-08-28

## 2024-09-16 ENCOUNTER — HOSPITAL ENCOUNTER (OUTPATIENT)
Dept: INFUSION THERAPY | Age: 69
Setting detail: INFUSION SERIES
Discharge: HOME | End: 2024-09-16
Payer: MEDICARE

## 2024-09-16 VITALS
TEMPERATURE: 98 F | OXYGEN SATURATION: 98 % | HEART RATE: 74 BPM | SYSTOLIC BLOOD PRESSURE: 149 MMHG | RESPIRATION RATE: 22 BRPM | DIASTOLIC BLOOD PRESSURE: 67 MMHG

## 2024-09-16 DIAGNOSIS — M81.0 SENILE OSTEOPOROSIS: Primary | ICD-10-CM

## 2024-09-16 PROCEDURE — 6360000002 HC RX W HCPCS: Performed by: OBSTETRICS & GYNECOLOGY

## 2024-09-16 PROCEDURE — 96372 THER/PROPH/DIAG INJ SC/IM: CPT

## 2024-09-16 RX ADMIN — DENOSUMAB 60 MG: 60 INJECTION SUBCUTANEOUS at 14:01

## 2024-11-12 LAB
25(OH)D3 SERPL-MCNC: 49.6 NG/ML (ref 30–100)
ALBUMIN SERPL-MCNC: 4.1 G/DL (ref 3.5–5.2)
ALP SERPL-CCNC: 32 U/L (ref 35–104)
ALT SERPL-CCNC: 15 U/L (ref 0–32)
ANION GAP SERPL CALCULATED.3IONS-SCNC: 16 MMOL/L (ref 7–16)
AST SERPL-CCNC: 14 U/L (ref 0–31)
BILIRUB SERPL-MCNC: 0.4 MG/DL (ref 0–1.2)
BUN SERPL-MCNC: 41 MG/DL (ref 6–23)
CALCIUM SERPL-MCNC: 9.6 MG/DL (ref 8.6–10.2)
CHLORIDE SERPL-SCNC: 105 MMOL/L (ref 98–107)
CO2 SERPL-SCNC: 21 MMOL/L (ref 22–29)
CREAT SERPL-MCNC: 1.1 MG/DL (ref 0.5–1)
GFR, ESTIMATED: 56 ML/MIN/1.73M2
GLUCOSE SERPL-MCNC: 95 MG/DL (ref 74–99)
HBA1C MFR BLD: 8.2 % (ref 4–5.6)
POTASSIUM SERPL-SCNC: 5.3 MMOL/L (ref 3.5–5)
PROT SERPL-MCNC: 7 G/DL (ref 6.4–8.3)
SODIUM SERPL-SCNC: 142 MMOL/L (ref 132–146)

## 2025-01-23 ENCOUNTER — HOSPITAL ENCOUNTER (OUTPATIENT)
Dept: GENERAL RADIOLOGY | Age: 70
Discharge: HOME | End: 2025-01-25
Payer: MEDICARE

## 2025-01-23 VITALS — BODY MASS INDEX: 27.92 KG/M2 | HEIGHT: 70 IN | WEIGHT: 195 LBS

## 2025-01-23 DIAGNOSIS — Z12.31 ENCOUNTER FOR SCREENING MAMMOGRAM FOR MALIGNANT NEOPLASM OF BREAST: ICD-10-CM

## 2025-01-23 PROCEDURE — 77063 BREAST TOMOSYNTHESIS BI: CPT

## 2025-02-19 LAB
25(OH)D3 SERPL-MCNC: 48.1 NG/ML (ref 30–100)
ALBUMIN SERPL-MCNC: 4.2 G/DL (ref 3.5–5.2)
ALP SERPL-CCNC: 37 U/L (ref 35–104)
ALT SERPL-CCNC: 12 U/L (ref 0–32)
ANION GAP SERPL CALCULATED.3IONS-SCNC: 14 MMOL/L (ref 7–16)
AST SERPL-CCNC: 12 U/L (ref 0–31)
BILIRUB SERPL-MCNC: 0.3 MG/DL (ref 0–1.2)
BUN SERPL-MCNC: 27 MG/DL (ref 6–23)
CALCIUM SERPL-MCNC: 9.8 MG/DL (ref 8.6–10.2)
CHLORIDE SERPL-SCNC: 107 MMOL/L (ref 98–107)
CO2 SERPL-SCNC: 25 MMOL/L (ref 22–29)
CREAT SERPL-MCNC: 1 MG/DL (ref 0.5–1)
GFR, ESTIMATED: 65 ML/MIN/1.73M2
GLUCOSE SERPL-MCNC: 95 MG/DL (ref 74–99)
HBA1C MFR BLD: 7 % (ref 4–5.6)
POTASSIUM SERPL-SCNC: 4.9 MMOL/L (ref 3.5–5)
PROT SERPL-MCNC: 7.4 G/DL (ref 6.4–8.3)
SODIUM SERPL-SCNC: 146 MMOL/L (ref 132–146)

## 2025-03-17 ENCOUNTER — HOSPITAL ENCOUNTER (OUTPATIENT)
Dept: INFUSION THERAPY | Age: 70
Setting detail: INFUSION SERIES
Discharge: HOME | End: 2025-03-17
Payer: MEDICARE

## 2025-03-17 VITALS
OXYGEN SATURATION: 99 % | DIASTOLIC BLOOD PRESSURE: 82 MMHG | TEMPERATURE: 98.7 F | RESPIRATION RATE: 20 BRPM | HEART RATE: 89 BPM | SYSTOLIC BLOOD PRESSURE: 160 MMHG

## 2025-03-17 DIAGNOSIS — M81.0 SENILE OSTEOPOROSIS: Primary | ICD-10-CM

## 2025-03-17 PROCEDURE — 96372 THER/PROPH/DIAG INJ SC/IM: CPT

## 2025-03-17 PROCEDURE — 6360000002 HC RX W HCPCS: Performed by: OBSTETRICS & GYNECOLOGY

## 2025-03-17 RX ADMIN — DENOSUMAB 60 MG: 60 INJECTION SUBCUTANEOUS at 14:13
